# Patient Record
Sex: MALE | Race: WHITE | Employment: FULL TIME | URBAN - METROPOLITAN AREA
[De-identification: names, ages, dates, MRNs, and addresses within clinical notes are randomized per-mention and may not be internally consistent; named-entity substitution may affect disease eponyms.]

---

## 2017-01-31 RX ORDER — MELOXICAM 15 MG/1
TABLET ORAL
Qty: 30 TABLET | Refills: 0 | Status: SHIPPED | OUTPATIENT
Start: 2017-01-31 | End: 2017-03-05 | Stop reason: SDUPTHER

## 2017-03-06 RX ORDER — MELOXICAM 15 MG/1
TABLET ORAL
Qty: 30 TABLET | Refills: 0 | Status: SHIPPED | OUTPATIENT
Start: 2017-03-06 | End: 2017-04-09 | Stop reason: SDUPTHER

## 2017-04-11 RX ORDER — MELOXICAM 15 MG/1
TABLET ORAL
Qty: 30 TABLET | Refills: 0 | Status: SHIPPED | OUTPATIENT
Start: 2017-04-11 | End: 2017-05-10 | Stop reason: SDUPTHER

## 2017-05-03 ENCOUNTER — OFFICE VISIT (OUTPATIENT)
Dept: ORTHOPEDIC SURGERY | Age: 52
End: 2017-05-03

## 2017-05-03 VITALS
HEIGHT: 73 IN | HEART RATE: 54 BPM | SYSTOLIC BLOOD PRESSURE: 116 MMHG | WEIGHT: 205 LBS | BODY MASS INDEX: 27.17 KG/M2 | DIASTOLIC BLOOD PRESSURE: 73 MMHG

## 2017-05-03 DIAGNOSIS — M19.012 GLENOHUMERAL ARTHRITIS, LEFT: Primary | ICD-10-CM

## 2017-05-03 PROCEDURE — 99214 OFFICE O/P EST MOD 30 MIN: CPT | Performed by: ORTHOPAEDIC SURGERY

## 2017-05-10 RX ORDER — MELOXICAM 15 MG/1
TABLET ORAL
Qty: 30 TABLET | Refills: 0 | Status: SHIPPED | OUTPATIENT
Start: 2017-05-10 | End: 2017-06-12 | Stop reason: SDUPTHER

## 2017-06-13 RX ORDER — MELOXICAM 15 MG/1
TABLET ORAL
Qty: 30 TABLET | Refills: 0 | Status: SHIPPED | OUTPATIENT
Start: 2017-06-13 | End: 2017-07-18 | Stop reason: SDUPTHER

## 2017-07-20 RX ORDER — MELOXICAM 15 MG/1
TABLET ORAL
Qty: 30 TABLET | Refills: 0 | Status: SHIPPED | OUTPATIENT
Start: 2017-07-20 | End: 2017-07-20 | Stop reason: SDUPTHER

## 2017-07-20 RX ORDER — MELOXICAM 15 MG/1
15 TABLET ORAL DAILY
Qty: 30 TABLET | Refills: 3 | Status: ON HOLD | OUTPATIENT
Start: 2017-07-20 | End: 2017-12-12 | Stop reason: HOSPADM

## 2017-10-18 ENCOUNTER — OFFICE VISIT (OUTPATIENT)
Dept: ORTHOPEDIC SURGERY | Age: 52
End: 2017-10-18

## 2017-10-18 VITALS
DIASTOLIC BLOOD PRESSURE: 69 MMHG | BODY MASS INDEX: 27.17 KG/M2 | WEIGHT: 205 LBS | HEIGHT: 73 IN | SYSTOLIC BLOOD PRESSURE: 114 MMHG | HEART RATE: 61 BPM

## 2017-10-18 DIAGNOSIS — M25.512 LEFT SHOULDER PAIN, UNSPECIFIED CHRONICITY: Primary | ICD-10-CM

## 2017-10-18 DIAGNOSIS — M19.012 GLENOHUMERAL ARTHRITIS, LEFT: ICD-10-CM

## 2017-10-18 PROCEDURE — 73030 X-RAY EXAM OF SHOULDER: CPT | Performed by: ORTHOPAEDIC SURGERY

## 2017-10-18 PROCEDURE — L3670 SO ACRO/CLAV CAN WEB PRE OTS: HCPCS | Performed by: ORTHOPAEDIC SURGERY

## 2017-10-18 PROCEDURE — 99213 OFFICE O/P EST LOW 20 MIN: CPT | Performed by: ORTHOPAEDIC SURGERY

## 2017-10-23 NOTE — PROGRESS NOTES
History of Present Illness:  Darline Mcqueen is a 51-year-old gentleman who is extremely active and involved in intense workouts including weight lifting presents to the office for follow-up regarding his glenohumeral arthritis of the left shoulder. He was told at his last visit that he would require surgical intervention at some point. He denies any new injuries to the shoulder. He reports he continues to stay active and likes to do at least 200 pushups during his workouts. He complains of increasing pain in the left shoulder with decreased motion as well. Medical History:  Patient's medications, allergies, past medical, surgical, social and family histories were reviewed and updated as appropriate. No notes on file    Review of Systems  A 14 point review of systems was completed by the patient on 10/18/17 and is available in the media section of the scanned medical record and was reviewed on 10/18/17. The review is negative with the exception of those things mentioned in the HPI and Past Medical History    Vital Signs:  Vitals:    10/18/17 0942   BP: 114/69   Pulse: 61       General/Appearance: Alert and oriented and in no apparent distress. Skin:  There are no skin lesions, cellulitis, or extreme edema. The patient has warm and well-perfused Bilateral upper extremities with brisk capillary refill. Left Shoulder Exam:  Inspection:  No gross deformities, no signs of infection. Palpation:  He has mild tenderness of the shoulder. Active Range of Motion: Forward elevation 110°, abduction of 90°, external rotation 35° and internal rotation to the back is to the L4 with active assisted motion, active range of motion is to the sacrum. Passive Range of Motion:  Similar to active    Strength:  4+/5 external rotation with resistance    Special Tests:  No Franklyn muscle deformity.     Neurovascular: Sensation to light touch is intact, no motor deficits, palpable radial pulses 2+    Radiology:     Plain radiographs of the left shoulder comprising 2 views: AP and axillary lateral were obtained and reviewed in the office: Shows severe glenohumeral narrowing along with severe degenerative changes. He has a significant bone spur present. MRI of the left shoulder 9/12/2016      FINDINGS: No recent macrofracture or bony contusion.       Advanced osteoarthrosis of the left glenohumeral joint alignment with remodeling of the humeral    head and glenoid cup, moderate to severe osteophyte formations and severe joint space    narrowing. Mild to moderate joint effusion with suspected free osseous bodies in the axillary    pouch, likely fractured spur. Fraying of the glenoid labrum is noted. Biceps long head tendon    is intact and in normal position.       Tractional pseudocyst formations at the conjoined tendon footprint region suggestive of    internal impingement. A few tractional pseudocyst formations at the subscapularis footprint    region. No substantive tendinopathy or retracted tendon tear. No substantive rotator cuff    muscle atrophy.       Moderate osteoarthritic changes with capsular hypertrophy of the acromioclavicular joint. Type    2 acromion without substantive lateral outlet encroachment.       CONCLUSION:   1. Advanced osteoarthrosis of the glenohumeral joint. Compared to prior study from 2012, this    finding is more conspicuous. 2. Fraying of the glenoid labrum. Moderate joint effusion and suspected free osseous body in    the axillary pouch, likely fractured spur. 3. No substantive rotator cuff muscle atrophy, tendinopathy or tear.          CT scan of the left shoulder, 9/12/2016     FINDINGS: No bony macrofracture. Loss of glenohumeral joint space with eburnation of the    glenoid and humeral head. A few foci of subcortical lucencies in the glenoid. Large spur of    inferior humeral head from anterior to posterior.       Rotator cuff thickness within normal limits. Moderate amount of joint effusion. No loose    ossified bodies.       Muscles unremarkable.       CONCLUSION:   Advanced glenohumeral arthrosis.             Assessment :  Mr. Robles Steven is a 46 y.o. yr old patient with endstage left shoulder glenohumeral arthritis. Impression:  Encounter Diagnoses   Name Primary?  Left shoulder pain, unspecified chronicity Yes    Glenohumeral arthritis, left        Office Procedures:  Orders Placed This Encounter   Procedures    XR Shoulder Left 2 VW     Order Specific Question:   Reason for exam:     Answer:   ap axillary lateral    Breg Sling Shot 3 Shoulder Sling     Patient was prescribed a Breg Sling Shot III Shoulder Brace. The left shoulder will require stabilization / immobilization from this orthosis. The orthosis will assist in protecting the affected area, provide functional support and facilitate healing. The device was ordered and fit on 10/18/2017 . The patient was educated and fit by a healthcare professional with expert knowledge and specialization in brace application while under the direct supervision of the treating physician. Verbal and written instructions for the use of and application of this item were provided. They were instructed to contact the office immediately should the brace result in increased pain, decreased sensation, increased swelling or worsening of the condition. Treatment Plan:  We discussed his diagnosis and the 3 possible surgical interventions I could be performed. Given how much of an active lifestyle this patient leads we are not recommending a total shoulder arthroplasty as he will carry a higher risk for loosening of the prosthesis. We are recommending either a resurfacing versus ream and run procedure with fibrocartilage on the socket versus the polyethylene button. Both procedures give him a high likelihood of 10 years plus survivorship prior to being converted into a total shoulder replacement.

## 2017-11-28 ENCOUNTER — TELEPHONE (OUTPATIENT)
Dept: ORTHOPEDIC SURGERY | Age: 52
End: 2017-11-28

## 2017-11-30 ENCOUNTER — HOSPITAL ENCOUNTER (OUTPATIENT)
Dept: PREADMISSION TESTING | Age: 52
Discharge: HOME | End: 2017-11-30
Attending: ORTHOPAEDIC SURGERY | Admitting: ORTHOPAEDIC SURGERY

## 2017-11-30 VITALS
SYSTOLIC BLOOD PRESSURE: 104 MMHG | OXYGEN SATURATION: 99 % | TEMPERATURE: 98.2 F | HEART RATE: 53 BPM | RESPIRATION RATE: 12 BRPM | HEIGHT: 73 IN | BODY MASS INDEX: 27.17 KG/M2 | WEIGHT: 205 LBS | DIASTOLIC BLOOD PRESSURE: 63 MMHG

## 2017-11-30 LAB
ABO/RH: NORMAL
ANION GAP SERPL CALCULATED.3IONS-SCNC: 10 MMOL/L (ref 3–16)
ANTIBODY SCREEN: NORMAL
APTT: 31 SEC (ref 24.1–34.9)
BASOPHILS ABSOLUTE: 0 K/UL (ref 0–0.2)
BASOPHILS RELATIVE PERCENT: 0.6 %
BILIRUBIN URINE: NEGATIVE
BLOOD, URINE: NEGATIVE
BUN BLDV-MCNC: 19 MG/DL (ref 7–20)
CALCIUM SERPL-MCNC: 8.7 MG/DL (ref 8.3–10.6)
CHLORIDE BLD-SCNC: 103 MMOL/L (ref 99–110)
CLARITY: CLEAR
CO2: 30 MMOL/L (ref 21–32)
COLOR: YELLOW
CREAT SERPL-MCNC: 0.9 MG/DL (ref 0.9–1.3)
EOSINOPHILS ABSOLUTE: 0.1 K/UL (ref 0–0.6)
EOSINOPHILS RELATIVE PERCENT: 2 %
GFR AFRICAN AMERICAN: >60
GFR NON-AFRICAN AMERICAN: >60
GLUCOSE BLD-MCNC: 91 MG/DL (ref 70–99)
GLUCOSE URINE: NEGATIVE MG/DL
HCT VFR BLD CALC: 38.1 % (ref 40.5–52.5)
HEMOGLOBIN: 12.5 G/DL (ref 13.5–17.5)
INR BLD: 0.96 (ref 0.85–1.15)
KETONES, URINE: NEGATIVE MG/DL
LEUKOCYTE ESTERASE, URINE: NEGATIVE
LYMPHOCYTES ABSOLUTE: 0.8 K/UL (ref 1–5.1)
LYMPHOCYTES RELATIVE PERCENT: 17 %
MCH RBC QN AUTO: 24 PG (ref 26–34)
MCHC RBC AUTO-ENTMCNC: 32.7 G/DL (ref 31–36)
MCV RBC AUTO: 73.3 FL (ref 80–100)
MICROSCOPIC EXAMINATION: NORMAL
MONOCYTES ABSOLUTE: 0.5 K/UL (ref 0–1.3)
MONOCYTES RELATIVE PERCENT: 10.4 %
NEUTROPHILS ABSOLUTE: 3.2 K/UL (ref 1.7–7.7)
NEUTROPHILS RELATIVE PERCENT: 70 %
NITRITE, URINE: NEGATIVE
PDW BLD-RTO: 15.6 % (ref 12.4–15.4)
PH UA: 6
PLATELET # BLD: 163 K/UL (ref 135–450)
PMV BLD AUTO: 9.4 FL (ref 5–10.5)
POTASSIUM SERPL-SCNC: 4.4 MMOL/L (ref 3.5–5.1)
PROTEIN UA: NEGATIVE MG/DL
PROTHROMBIN TIME: 10.9 SEC (ref 9.6–13)
RBC # BLD: 5.2 M/UL (ref 4.2–5.9)
SODIUM BLD-SCNC: 143 MMOL/L (ref 136–145)
SPECIFIC GRAVITY UA: 1.01
URINE TYPE: NORMAL
UROBILINOGEN, URINE: 0.2 E.U./DL
WBC # BLD: 4.6 K/UL (ref 4–11)

## 2017-11-30 RX ORDER — CHLORHEXIDINE GLUCONATE 0.12 MG/ML
15 RINSE ORAL 2 TIMES DAILY
Status: CANCELLED | OUTPATIENT
Start: 2017-12-10 | End: 2017-12-01

## 2017-11-30 RX ORDER — GLUTAMINE 100 %
POWDER (GRAM) ORAL DAILY
COMMUNITY

## 2017-11-30 RX ORDER — MULTIVIT,IRON,MINERALS/LUTEIN
TABLET ORAL DAILY
COMMUNITY

## 2017-11-30 RX ORDER — CHLORAL HYDRATE 500 MG
3000 CAPSULE ORAL DAILY
COMMUNITY

## 2017-11-30 RX ORDER — M-VIT,TX,IRON,MINS/CALC/FOLIC 27MG-0.4MG
1 TABLET ORAL DAILY
COMMUNITY

## 2017-11-30 RX ORDER — CHLORHEXIDINE GLUCONATE 0.12 MG/ML
15 RINSE ORAL 2 TIMES DAILY
Status: CANCELLED | OUTPATIENT
Start: 2017-12-10

## 2017-11-30 NOTE — PRE-PROCEDURE INSTRUCTIONS
[address]                          Date of Procedure  12/11  Time of Procedure 0730    PRIOR TO PROCEDURE DATE:  1. Please follow any guidelines/instructions prior to your procedure as advised by your surgeon. 2. Arrange for someone to drive you home and be with you for the first 24 hours after discharge for your safety after your procedure for which you received sedation. Ensure it is someone we can share information with regarding your discharge. 3. You must contact your surgeon for instructions IF:   You are taking any blood thinners, aspirin, anti-inflammatory or vitamin E.   There is a change in your physical condition such as a cold, fever, rash, cuts, sores or any other infection, especially near your surgical site. 4. Do not drink alcohol the day before or day of your procedure. 5. A Pre-op History and Physical for surgery MUST be completed by your Physician or Urgent Care within 30 days of your procedure date. Please bring a copy with you on the day of your procedure and along with any other testing performed. THE DAY OF YOUR PROCEDURE:  1. Follow instructions for ARRIVAL TIME as DIRECTED BY YOUR SURGEON. If your surgeon does not give you a specific arrival time, please arrive at  [facility]- verify with surgeon . 2. Enter the MAIN entrance from [address] and follow the signs to the free [facility] or [facility] parking (offered free of charge 6am-5pm). 3. Enter the Main Entrance of the hospital (do NOT enter from the lower level of the parking garage). Upon entrance, check in with the  at the main desk on your left. If no one is available at the desk, proceed into the [facility] Waiting Room and go through the door directly into the [facility]. There is a Check-in desk ACROSS from Room 5 (marked with a sign hanging from the ceiling).  The phone number for the surgery center is [phone].    4. DO NOT EAT OR DRINK

## 2017-12-01 LAB
ESTIMATED AVERAGE GLUCOSE: 91.1 MG/DL
HBA1C MFR BLD: 4.8 %
MRSA SCREEN RT-PCR: NORMAL
URINE CULTURE, ROUTINE: NORMAL

## 2017-12-01 RX ORDER — SODIUM CHLORIDE, SODIUM LACTATE, POTASSIUM CHLORIDE, CALCIUM CHLORIDE 600; 310; 30; 20 MG/100ML; MG/100ML; MG/100ML; MG/100ML
INJECTION, SOLUTION INTRAVENOUS CONTINUOUS
Status: CANCELLED | OUTPATIENT
Start: 2017-12-10

## 2017-12-11 PROBLEM — M19.012 OSTEOARTHRITIS OF LEFT SHOULDER: Status: ACTIVE | Noted: 2017-12-11

## 2017-12-18 ENCOUNTER — OFFICE VISIT (OUTPATIENT)
Dept: ORTHOPEDIC SURGERY | Age: 52
End: 2017-12-18

## 2017-12-18 VITALS
BODY MASS INDEX: 27.17 KG/M2 | HEART RATE: 59 BPM | SYSTOLIC BLOOD PRESSURE: 114 MMHG | WEIGHT: 205 LBS | DIASTOLIC BLOOD PRESSURE: 68 MMHG | HEIGHT: 73 IN

## 2017-12-18 DIAGNOSIS — Z96.612 STATUS POST LEFT SHOULDER HEMIARTHROPLASTY: ICD-10-CM

## 2017-12-18 DIAGNOSIS — M25.512 ACUTE PAIN OF LEFT SHOULDER: Primary | ICD-10-CM

## 2017-12-18 PROCEDURE — 73030 X-RAY EXAM OF SHOULDER: CPT | Performed by: PHYSICIAN ASSISTANT

## 2017-12-18 PROCEDURE — 99024 POSTOP FOLLOW-UP VISIT: CPT | Performed by: PHYSICIAN ASSISTANT

## 2017-12-18 NOTE — PROGRESS NOTES
History of Present Illness:  Ally Chávez is a 46 y.o. male who presents for a post operative visit. The patient underwent a left shoulder hemiarthroplasty ream and run procedure on 12/11/17 by Dr. Khushi Breaux. Overall he is doing okay and feels that their pain is well controlled with current pain medications. He reports he took a few Percocets the first couple days after surgery however he is primarily taking Tylenol for pain control currently. He has been compliant with wearing the UltraSling brace at all times. He has been using his CPM machine at home and has been icing his shoulder significantly. The patient deny fevers, chills, numbness, tingling, and shortness of breath. Medical History:  Patient's medications, allergies, past medical, surgical, social and family histories were reviewed and updated as appropriate. No notes on file    Review of Systems  A 14 point review of systems was completed by the patient on 10/18/17 and is available in the media section of the scanned medical record and was reviewed on 12/18/2017. Vital Signs:  Vitals:    12/18/17 0907   BP: 114/68   Pulse: 59       General/Appearance: Alert and oriented and in no apparent distress. Skin:  There are no skin lesions, cellulitis, or extreme edema. The patient has warm and well-perfused Bilateral upper extremities with brisk capillary refill. left Shoulder Exam:    Inspection: left shoulder incision that is clean, dry and intact and well approximated. The Prineo dressing is still in place. Mild ecchymosis and swelling are present as can be expected. There is no erythema, drainage or other signs of infection    Palpation:  No crepitus to gentle motion    Active Range of Motion: Deferred    Passive Range of Motion:  He is able to tolerate forward elevation to 40° and external rotation 25°    Strength:  Deferred    Special Tests:  Deferred.     Neurovascular: Sensation to light touch is intact, no motor deficits, palpable radial pulses 2+    Radiology:     Plain radiographs of the left shoulder comprising 2 views: AP and axillary lateral were obtained and reviewed in the office: Shows postsurgical changes from the left hemiarthroplasty. All the components are in good placement without any signs of loosening, fractures, subluxations or dislocations. Impression: Stable postop x-ray. Assessment :  Mr. Zach Sanchez is a 46 y.o. yr old patient who is 1 week status post a left shoulder hemiarthroplasty, ream and run procedure. Impression:  Encounter Diagnoses   Name Primary?  Acute pain of left shoulder Yes    Status post left shoulder hemiarthroplasty        Office Procedures:  Orders Placed This Encounter   Procedures    Shoulder Left 2V     [id number]     Order Specific Question:   Reason for exam:     Answer:   Pain       Treatment Plan:    Overall the patient is doing well. The pain is well-controlled. He was told that he may take Tylenol for pain control. He still has his prescription medication if he does need that. We recommend that he wear the UltraSling brace at all times with the exception of clothing, bathing of physical therapy. The patient was told that he is restricted from driving for at least 3 weeks postop. We will give a print out of their physical therapy order. He has opted to do his therapy at the [facility]. He may continue use of CPM machine. All of his questions were fully answered today. We would like to see him back in 2 weeks for follow-up visit. Janis Farris PA-C  Orthopaedic Sports Medicine Physician Assistant    This dictation was performed with a verbal recognition program [facility]) and it was checked for errors. It is possible that there are still dictated errors within this office note. If so, please bring any errors to my attention for an addendum. All efforts were made to ensure that this office note is accurate.

## 2018-01-03 ENCOUNTER — OFFICE VISIT (OUTPATIENT)
Dept: ORTHOPEDIC SURGERY | Age: 53
End: 2018-01-03

## 2018-01-03 VITALS
HEART RATE: 65 BPM | DIASTOLIC BLOOD PRESSURE: 69 MMHG | BODY MASS INDEX: 27.17 KG/M2 | SYSTOLIC BLOOD PRESSURE: 118 MMHG | HEIGHT: 73 IN | WEIGHT: 205 LBS

## 2018-01-03 DIAGNOSIS — Z96.612 STATUS POST TOTAL SHOULDER ARTHROPLASTY, LEFT: Primary | ICD-10-CM

## 2018-01-03 PROCEDURE — 99024 POSTOP FOLLOW-UP VISIT: CPT | Performed by: ORTHOPAEDIC SURGERY

## 2018-01-03 NOTE — LETTER
Home exercise program (copy to patient). Perform exercises for:   15    minutes    3      times/day   Supervised physical therapy  Frequency:   1x week   2x week   3x week   Other:   Duration:  2 weeks    4 weeks   6 weeks   Other:     Additional Instructions: Will advance CPM incrementally. Obviously, off to good start but needs to stay within program and protect subscapularis (no active IR especially in max IR position)      Adrián Dixon MD, PhD  1/3/2018

## 2018-02-07 ENCOUNTER — OFFICE VISIT (OUTPATIENT)
Dept: ORTHOPEDIC SURGERY | Age: 53
End: 2018-02-07

## 2018-02-07 VITALS
BODY MASS INDEX: 27.17 KG/M2 | SYSTOLIC BLOOD PRESSURE: 107 MMHG | HEART RATE: 72 BPM | WEIGHT: 205.03 LBS | HEIGHT: 73 IN | DIASTOLIC BLOOD PRESSURE: 72 MMHG

## 2018-02-07 DIAGNOSIS — Z96.612 STATUS POST LEFT SHOULDER HEMIARTHROPLASTY: Primary | ICD-10-CM

## 2018-02-07 PROCEDURE — 99024 POSTOP FOLLOW-UP VISIT: CPT | Performed by: ORTHOPAEDIC SURGERY

## 2018-02-07 NOTE — LETTER
Physical Therapy Rehabilitation Referral    Patient Name:  Alexandra De Souza      YOB: 1965    Diagnosis:    1. Status post left shoulder hemiarthroplasty        Precautions:      Evaluate and Treat    Post Op Instructions:   Continuous passive motion (CPM)  Elbow ROM   Exercise in plane of scapula    Strengthening      Pulley and instruction    Home exercise program (copy to patient)    Sling when arm at risk   Sling or brace at all times    AROM: Forward elevation              AROM: External rotation     Isometric external rotator strengthening  AAROM: internal rotation: up the back   Isometric abductor strengthening   AAROM: Internal abduction    Isometric internal rotator strengthening  AAROM: cross-body adduction             Stretching:     Strengthening:   Four quadrant (FE, ER, IR, CBA)   Rotator cuff (ER, Abd)   Forward Elevation     External Rotators      External Rotation     Internal Rotators   Internal Rotation: up/back    Abductors      Internal Rotation: supine in abduction  Flexors   Cross-body abduction     Extensors   Pendulum (FE, Abd/Add, cw/ccw)   Scapular Stabilizers    Wall-walking (FE, Abd)         Shoulder shrugs      Table slides (FE)                 Rhomboid pinch   Elbow (flex, ext, pron, sup)         Lat.  Pull downs      Medial epicondylitis program        Forward punch    Lateral epicondylitis program        Internal rotators      Progressive resistive exercises   Bench Press         Bench press plus  Activities:      Lateral pull-downs   Rowing      Progressive two-hand supine press   Stepper/Exercise bike    Biceps: curls/supination   Swimming   Water exercises    Modalities:     Return to Sport:   Of Choice       Plyometrics   Ultrasound      Rhythmic stabilization   Iontophoresis     Core strengthening    Moist heat      Sports specific program:    Massage          Cryotherapy       Electrical stimulation      Paraffin   Whirlpool   TENS Home exercise program (copy to patient). Perform exercises for:   30     minutes    3      times/day   Supervised physical therapy  Frequency:   1x week   2x week   3x week   Other:   Duration:  2 weeks    4 weeks   6 weeks   Other:     Additional Instructions:   Continue to work on AROM in all planes. May start gentle strengthening for deltoid and external rotators. NO IR strengthening yet. May start gentle cardio on Elliptical/ Stairmaster. No treadmill or jump rope until 10 weeks post op.             Nghia Montano MD

## 2018-03-14 ENCOUNTER — OFFICE VISIT (OUTPATIENT)
Dept: ORTHOPEDIC SURGERY | Age: 53
End: 2018-03-14

## 2018-03-14 VITALS
HEIGHT: 72 IN | DIASTOLIC BLOOD PRESSURE: 68 MMHG | HEART RATE: 57 BPM | WEIGHT: 205 LBS | SYSTOLIC BLOOD PRESSURE: 114 MMHG | BODY MASS INDEX: 27.77 KG/M2

## 2018-03-14 DIAGNOSIS — Z96.612 STATUS POST LEFT SHOULDER HEMIARTHROPLASTY: Primary | ICD-10-CM

## 2018-03-14 PROCEDURE — 99213 OFFICE O/P EST LOW 20 MIN: CPT | Performed by: ORTHOPAEDIC SURGERY

## 2018-03-14 NOTE — LETTER
Shoulder Elbow Rehabilitation Referral    Patient Name: Tarah Yoo      YOB: 1965    Diagnosis: left shoulder resurfacing hemiarthroplasty    Surgery Date: 12/11/17      Post Op Instructions:  [] Continuous passive motion (CPM)  [] Elbow range of motion  [] Exercise in plane of scapula  []  Strengthening     [] Pulley and instruction   [x] Home exercise program (copy to patient)   [] Sling when arm at risk  [] Sling or brace at all times   [] AAROM: Forward elevation to              [] AAROM: External rotation  To      [] Isometric external rotator strengthening [] AAROM: internal rotation: up the back  [] Isometric abductor strengthening  [] AAROM: Internal abduction     [] Isometric internal rotator strengthening [] AAROM: cross-body adduction             Stretching:     Strengthening:  [] Four quadrant (FE, ER, IR, CBA)  [] Sleeper stretch    [] Rotator cuff (ER, IR, Abd)  [x] Forward Elevation    [x] External Rotators     [x] External Rotation    [x] Internal Rotators  [x] Internal Rotation: up/back   [x] Abductors     [x] Internal Rotation: supine in abduction [x] Flexors  [x] Cross-body abduction    [x] Extensors  [] Pendulum (FE, Abd/Add, cw/ccw)  [x] Scapular Stabilizers   [] Wall-walking (FE, Abd)    [x] Shoulder shrugs     [] Table slides      [x] Rhomboid pinch  [] Elbow (flex, ext, pron, sup)    [x] Lat.  Pull downs     [] Medial epicondylitis program   [] Forward punch   [] Lateral epicondylitis program   [x] Internal rotators     [] Progressive resistive exercises  [] Bench Press        [] Bench press plus  Activities:     [] Lateral pull-downs  [] Rowing     [] Progressive two-hand supine press  [] Stepper/Exercise bike   [] Biceps: curls/supination  [] Swimming  [] Water exercises    Modalities: PRN    Return to Sport:  [] Ultrasound     [] Plyometrics  [] Iontophoresis    [] Rhythmic stabilization  [x] Moist heat     [] Core strengthening [x] Massage     [] Sports specific program:      [x] Cryotherapy      [] Electrical stimulation     [] Paraffin  [] Whirlpool  [] TENS    [x] Home exercise program (copy to patient). [x] Supervised physical therapy  Frequency: [x]  1x week  [] 2x week  [] 3x week  [] Other:   Duration: [] 2 weeks   [] 4 weeks  [x] 6 weeks  [] Other:     Sincerely,        Kim Reynolds MD  Clinical Fellow in [address] [place] [address]  3/14/2018     This dictation was performed with a verbal recognition program [facility]) and it was checked for errors. It is possible that there are still dictated errors within this office note. If so, please bring any errors to my attention for an addendum. All efforts were made to ensure that this office note is accurate.

## 2018-03-14 NOTE — PROGRESS NOTES
gross instability    Neurovascular: Neurovascularly intact    Special Tests: Negative belly press test negative [place] sign        Diagnostics:  Radiology:     1 x-rays obtained today      Assessment: Patient is nearly 3 months status post left shoulder hemiarthroplasty resurfacing. Overall, Colon Spurling is doing well      Plan: We have counseled the patient extensively about his postoperative rehab. We do think that there is room for improvement in his range of motion even though the patient has done extremly well so far. As such we will be sending over new instructions to his physical therapist to focus on stretching and range of motion exercises of his left shoulder. We do believe that the patient's strength is gradually improving and we will work on this as well but at this point want to focus on increasing his range of motion. The patient stated a good understanding of everything that was explained and will follow up in our clinic in 1 month for reevaluation. Orders Placed This Encounter   Procedures    Amb External Referral To Physical Therapy     Referral Priority:   Routine     Referral Type:   Consult for Advice and Opinion     Referral Reason:   Patient Preference     Requested Specialty:   Physical Therapy     Number of Visits Requested:   [address] MD  [address] [place] [address]  Date:    3/14/2018    This dictation was performed with a verbal recognition program ([employer]) and it was checked for errors. It is possible that there are still dictated errors within this office note. If so, please bring any errors to my attention for an addendum. All efforts were made to ensure that this office note is accurate.      Attestation  I attest that my encounter today with rekha Clay  was supervised by Dr. Gladys Evans  ________________  I was physically present and personally supervised the Orthopaedic Sports Medicine Fellow in the evaluation and development of a

## 2018-05-09 ENCOUNTER — OFFICE VISIT (OUTPATIENT)
Dept: ORTHOPEDIC SURGERY | Age: 53
End: 2018-05-09

## 2018-05-09 VITALS
HEART RATE: 59 BPM | SYSTOLIC BLOOD PRESSURE: 117 MMHG | HEIGHT: 72 IN | DIASTOLIC BLOOD PRESSURE: 69 MMHG | BODY MASS INDEX: 27.77 KG/M2 | WEIGHT: 205.03 LBS

## 2018-05-09 DIAGNOSIS — Z96.612 S/P SHOULDER HEMIARTHROPLASTY, LEFT: Primary | ICD-10-CM

## 2018-05-09 PROCEDURE — 99213 OFFICE O/P EST LOW 20 MIN: CPT | Performed by: ORTHOPAEDIC SURGERY

## 2018-12-12 ENCOUNTER — OFFICE VISIT (OUTPATIENT)
Dept: ORTHOPEDIC SURGERY | Age: 53
End: 2018-12-12
Payer: COMMERCIAL

## 2018-12-12 VITALS
BODY MASS INDEX: 27.17 KG/M2 | HEIGHT: 73 IN | WEIGHT: 205 LBS | DIASTOLIC BLOOD PRESSURE: 67 MMHG | SYSTOLIC BLOOD PRESSURE: 108 MMHG | HEART RATE: 61 BPM

## 2018-12-12 DIAGNOSIS — Z96.612 S/P SHOULDER HEMIARTHROPLASTY, LEFT: ICD-10-CM

## 2018-12-12 PROCEDURE — 99213 OFFICE O/P EST LOW 20 MIN: CPT | Performed by: ORTHOPAEDIC SURGERY

## 2019-12-18 ENCOUNTER — OFFICE VISIT (OUTPATIENT)
Dept: ORTHOPEDIC SURGERY | Age: 54
End: 2019-12-18
Payer: COMMERCIAL

## 2019-12-18 VITALS
HEIGHT: 73 IN | BODY MASS INDEX: 27.17 KG/M2 | WEIGHT: 205.03 LBS | DIASTOLIC BLOOD PRESSURE: 63 MMHG | SYSTOLIC BLOOD PRESSURE: 116 MMHG | HEART RATE: 55 BPM

## 2019-12-18 DIAGNOSIS — Z96.612 S/P SHOULDER HEMIARTHROPLASTY, LEFT: Primary | ICD-10-CM

## 2019-12-18 PROCEDURE — 4004F PT TOBACCO SCREEN RCVD TLK: CPT | Performed by: ORTHOPAEDIC SURGERY

## 2019-12-18 PROCEDURE — G8427 DOCREV CUR MEDS BY ELIG CLIN: HCPCS | Performed by: ORTHOPAEDIC SURGERY

## 2019-12-18 PROCEDURE — 99214 OFFICE O/P EST MOD 30 MIN: CPT | Performed by: ORTHOPAEDIC SURGERY

## 2019-12-18 PROCEDURE — G8484 FLU IMMUNIZE NO ADMIN: HCPCS | Performed by: ORTHOPAEDIC SURGERY

## 2019-12-18 PROCEDURE — 3017F COLORECTAL CA SCREEN DOC REV: CPT | Performed by: ORTHOPAEDIC SURGERY

## 2019-12-18 PROCEDURE — G8419 CALC BMI OUT NRM PARAM NOF/U: HCPCS | Performed by: ORTHOPAEDIC SURGERY

## 2019-12-18 ASSESSMENT — ENCOUNTER SYMPTOMS
EYES NEGATIVE: 1
GASTROINTESTINAL NEGATIVE: 1
ALLERGIC/IMMUNOLOGIC NEGATIVE: 1
RESPIRATORY NEGATIVE: 1

## 2019-12-20 ENCOUNTER — OFFICE VISIT (OUTPATIENT)
Dept: ORTHOPEDIC SURGERY | Age: 54
End: 2019-12-20
Payer: COMMERCIAL

## 2019-12-20 VITALS
HEART RATE: 63 BPM | DIASTOLIC BLOOD PRESSURE: 65 MMHG | WEIGHT: 200 LBS | BODY MASS INDEX: 26.51 KG/M2 | SYSTOLIC BLOOD PRESSURE: 107 MMHG | HEIGHT: 73 IN

## 2019-12-20 DIAGNOSIS — M19.012 ARTHRITIS OF LEFT ACROMIOCLAVICULAR JOINT: ICD-10-CM

## 2019-12-20 DIAGNOSIS — Z96.612 S/P SHOULDER HEMIARTHROPLASTY, LEFT: Primary | ICD-10-CM

## 2019-12-20 PROCEDURE — 3017F COLORECTAL CA SCREEN DOC REV: CPT | Performed by: ORTHOPAEDIC SURGERY

## 2019-12-20 PROCEDURE — G8419 CALC BMI OUT NRM PARAM NOF/U: HCPCS | Performed by: ORTHOPAEDIC SURGERY

## 2019-12-20 PROCEDURE — G8427 DOCREV CUR MEDS BY ELIG CLIN: HCPCS | Performed by: ORTHOPAEDIC SURGERY

## 2019-12-20 PROCEDURE — G8484 FLU IMMUNIZE NO ADMIN: HCPCS | Performed by: ORTHOPAEDIC SURGERY

## 2019-12-20 PROCEDURE — 4004F PT TOBACCO SCREEN RCVD TLK: CPT | Performed by: ORTHOPAEDIC SURGERY

## 2019-12-20 PROCEDURE — 99213 OFFICE O/P EST LOW 20 MIN: CPT | Performed by: ORTHOPAEDIC SURGERY

## 2020-01-15 ENCOUNTER — OFFICE VISIT (OUTPATIENT)
Dept: ORTHOPEDIC SURGERY | Age: 55
End: 2020-01-15
Payer: COMMERCIAL

## 2020-01-15 VITALS
HEIGHT: 73 IN | BODY MASS INDEX: 26.5 KG/M2 | HEART RATE: 52 BPM | SYSTOLIC BLOOD PRESSURE: 126 MMHG | WEIGHT: 199.96 LBS | DIASTOLIC BLOOD PRESSURE: 72 MMHG

## 2020-01-15 PROCEDURE — 3017F COLORECTAL CA SCREEN DOC REV: CPT | Performed by: ORTHOPAEDIC SURGERY

## 2020-01-15 PROCEDURE — G8419 CALC BMI OUT NRM PARAM NOF/U: HCPCS | Performed by: ORTHOPAEDIC SURGERY

## 2020-01-15 PROCEDURE — G8427 DOCREV CUR MEDS BY ELIG CLIN: HCPCS | Performed by: ORTHOPAEDIC SURGERY

## 2020-01-15 PROCEDURE — G8484 FLU IMMUNIZE NO ADMIN: HCPCS | Performed by: ORTHOPAEDIC SURGERY

## 2020-01-15 PROCEDURE — 4004F PT TOBACCO SCREEN RCVD TLK: CPT | Performed by: ORTHOPAEDIC SURGERY

## 2020-01-15 PROCEDURE — 99214 OFFICE O/P EST MOD 30 MIN: CPT | Performed by: ORTHOPAEDIC SURGERY

## 2020-01-15 NOTE — PROGRESS NOTES
No  Are there other pain locations you wish to document?: No    Review of Systems    Last updated on 12/18/19       Review of Systems:  A 14 point review of systems available in the scanned medical record as documented by the patient. The review is negative with the exception of those things mentioned in the History of Present Illness and Past Medical History. Past Medical History:  Patient's medications, allergies, past medical, surgical, social and family histories were reviewed and updated as appropriate. Allergies: Allergies   Allergen Reactions    Penicillins Hives       Physical Exam:  Vitals:    01/15/20 1115   BP: 126/72   Pulse: 52     General: Chas De La Rosa is a healthy and well appearing 47 y.o. male who is sitting comfortably in our office in acute distress. Skin:  There are no skin lesions, cellulitis, or extreme edema. The patient has warm and well-perfused Bilateral upper extremities with brisk capillary refill. Eyes: Extra-ocular muscles intact  Mouth: Oral mucosa moist. No perioral lesions  Pulm: Respirations unlabored and regular. Neuro: Alert and oriented x3    right Shoulder Exam:  Inspection:  No gross deformities, no signs of infection. Palpation: No crepitus     Active Range of Motion: Forward elevation of 135, abduction of 135, external rotation with elbow at the side 40, internal rotation to the back is L3    Passive Range of Motion: Forward elevation to 140 degrees. Strength: External rotation with resistance with elbow at the side +4/5, internal rotation with resistance with elbow at the side 5/5, supraspinatus testing 4/5    Special Tests:   No Franklyn muscle deformity. Neurovascular: Sensation to light touch is intact, no motor deficits, palpable radial pulses 2+    Additional Examinations:    Examination of the contralateral extremity does not show any tenderness, deformity or injury. Range of motion is unremarkable. There is no gross instability.  There and the resurfacing is 1mm, and normal congruency is    demonstrated.       Hypertrophy of the posterior rim of the glenoid may preexist the surgery.  See axial image    number 87 of series 3.       The rotator cuff muscles are normal in volume, including the supraspinatus.       CONCLUSION:   1. No evidence for loosening of the humeral hemiarthroplasty. 2. The subacromial interval measures 5mm   3. Congruent and narrowed arthroplasty-glenoid gap that measures 1mm. 4. Hypertrophy of the posterior rim of the glenoid may preexist the surgery. 5. Normal volume of the rotator group muscles. 6. Remote unhealed fracture at the posterior aspect of the distal clavicle. 7. Loose appearance of one and suspected loose second nonmetallic anchor. Assessment:  Eriberto Argueta is a 47 y.o. male with a history of undergoing a left shoulder resurfacing hemiarthroplasty on 12/1/2017. He has some cuff thinning but no tear. The most likely explanation for his pain is some glenoid erosion and overuse. Impression:  Encounter Diagnosis   Name Primary?  S/P shoulder hemiarthroplasty, left Yes       Office Procedures:  No orders of the defined types were placed in this encounter. Plan: We had a lengthy discussion with the patient today. We were able to review the ultrasound and the CT scan along with his most recent lab results. There is very little evidence for any ongoing infection of the left shoulder. There is some evidence that he may be wearing into the glenoid. At this point we recommend that he continue to do activities as tolerated. If he comes to a point where the discomfort is beyond his threshold we can certainly consider surgical intervention which would include converting to an anatomic total shoulder replacement. All this was discussed in full detail with the patient. They were agreeable to the above plan.   We would like to see him yearly for radiographs to watch his left

## 2020-12-16 ENCOUNTER — OFFICE VISIT (OUTPATIENT)
Dept: ORTHOPEDIC SURGERY | Age: 55
End: 2020-12-16
Payer: COMMERCIAL

## 2020-12-16 VITALS — BODY MASS INDEX: 26.5 KG/M2 | HEIGHT: 73 IN | WEIGHT: 199.96 LBS | TEMPERATURE: 97.2 F

## 2020-12-16 PROCEDURE — G8484 FLU IMMUNIZE NO ADMIN: HCPCS | Performed by: ORTHOPAEDIC SURGERY

## 2020-12-16 PROCEDURE — 3017F COLORECTAL CA SCREEN DOC REV: CPT | Performed by: ORTHOPAEDIC SURGERY

## 2020-12-16 PROCEDURE — 99213 OFFICE O/P EST LOW 20 MIN: CPT | Performed by: ORTHOPAEDIC SURGERY

## 2020-12-16 PROCEDURE — G8427 DOCREV CUR MEDS BY ELIG CLIN: HCPCS | Performed by: ORTHOPAEDIC SURGERY

## 2020-12-16 PROCEDURE — G8419 CALC BMI OUT NRM PARAM NOF/U: HCPCS | Performed by: ORTHOPAEDIC SURGERY

## 2020-12-16 PROCEDURE — 4004F PT TOBACCO SCREEN RCVD TLK: CPT | Performed by: ORTHOPAEDIC SURGERY

## 2020-12-16 NOTE — PROGRESS NOTES
12 [facility]  History and Physical  Shoulder Pain    Date:  2020    Name:  Levon Mccollum  Address:  [address] [address]    :  1965      Age:   54 y.o.    SSN:  [id number]      Medical Record Number:  [id number]    Reason for Visit:    Follow-up (left shoulder )      HPI:   Levon Mccollum is a 54 y.o. male who presents to our office today for follow up of the left shoulder pain. Patient underwent a left shoulder resurfacing hemiarthroplasty by Dr. Kaitlyn Lozano on 2017. Overall he does feel that the goal of the surgery which is to allow him to continue to have a high level of activity including being able to work out has been met. He reports he does work out daily however over the last year he has learned what to do and what not to do. He reports he works out early in the morning and does have some discomfort soon after. He takes 2 ibuprofen which helps him for the rest of the day. He reports he sleeps comfortably at nighttime. Pain Assessment  Location of Pain: Shoulder  Location Modifiers: Left  Severity of Pain: 3  Quality of Pain: Aching  Duration of Pain: Persistent  Frequency of Pain: Constant  Aggravating Factors: Other (Comment)(certain use)  Relieving Factors: Rest  Result of Injury: No  Work-Related Injury: No  Are there other pain locations you wish to document?: No    Review of Systems    Done: 19      Review of Systems:  A 14 point review of systems available in the scanned medical record as documented by the patient. The review is negative with the exception of those things mentioned in the History of Present Illness and Past Medical History. Past Medical History:  Patient's medications, allergies, past medical, surgical, social and family histories were reviewed and updated as appropriate. Allergies:   Allergies   Allergen Reactions    Penicillins Hives       Physical Exam:  Vitals: 12/16/20 0908   Temp: 97.2 °F (36.2 °C)     General: Raul Nayak is a healthy and well appearing 54 y.o. male who is sitting comfortably in our office in acute distress. Skin:  There are no skin lesions, cellulitis, or extreme edema. The patient has warm and well-perfused Bilateral upper extremities with brisk capillary refill. Eyes: Extra-ocular muscles intact  Mouth: Oral mucosa moist. No perioral lesions  Pulm: Respirations unlabored and regular. Neuro: Alert and oriented x3    left Shoulder Exam:  Inspection:  No gross deformities, no signs of infection. Palpation: No crepitus and no tenderness about the shoulder    Active Range of Motion: Forward elevation of 120, abduction of 110, external rotation with elbow at the side 40, internal rotation to the back is T12    Passive Range of Motion: deferred    Strength: External rotation with resistance with elbow at the side 4/5, internal rotation with resistance with elbow at the side 5/5, supraspinatus testing 4/5    Special Tests: Negative external lag, no Franklyn muscle deformity. Neurovascular: Sensation to light touch is intact, no motor deficits, palpable radial pulses 2+    Additional Examinations:    Examination of the contralateral extremity does not show any tenderness, deformity or injury. Range of motion is unremarkable. There is no gross instability. There are no rashes, ulcerations or lesions. Strength and tone are normal.      Radiographic:  3 xray views of the left  shoulder including True AP in internal and external and axillary lateral were taken in our office today reveals a hemiarthroplasty of the left shoulder. There is narrowing of the acromium to tuberosity height with mild wearing in to the glenoid. No fractures, dislocations, visible tumors, or signs of acute trauma. Self assessment questionnaires including ASES and Simple Shoulder Test were completed today.     Assessment: Nishant Villalpando is a 54 y.o. male who underwent a left shoulder resurfacing hemiarthroplasty on December 1, 2017. Radiographically there are signs of rotator cuff insufficiency. Clinically he continues to do very well. Impression:  Encounter Diagnoses   Name Primary?  Left shoulder pain, unspecified chronicity Yes    Status post left shoulder hemiarthroplasty        Office Procedures:  Orders Placed This Encounter   Procedures    XR SHOULDER LEFT (MIN 2 VIEWS)     Standing Status:   Future     Number of Occurrences:   1     Standing Expiration Date:   12/16/2021       Plan:   He continues to show stability with the partial replacement. At this point we recommend he continues with activities as tolerated. He may have to modify some activities that are over shoulder height level. We will see him back on a yearly basis to review radiographs. We had him fill out a self-assessment questionnaire. Greater than 15 minutes were expended during today's visit with 50% devoted to discussion of assessment and long term plan/ treatment options over time. 12/16/2020  9:30 AM      Jennifer Mercer PA-C  Orthopaedic Sports Medicine Physician Assistant    During this examination, IJennifer PA-C, functioned as a scribe for Dr. Brigido Angel. This dictation was performed with a verbal recognition program (DRAGON) and it was checked for errors. It is possible that there are still dictated errors within this office note. If so, please bring any errors to my attention for an addendum. All efforts were made to ensure that this office note is accurate.  ________________  I, Dr. Brigido Angel, personally performed the services described in this documentation as described by Jennifer Mercer PA-C in my presence, and it is both accurate and complete. Adrián Huang MD, PhD  12/16/2020

## 2022-03-02 ENCOUNTER — OFFICE VISIT (OUTPATIENT)
Dept: ORTHOPEDIC SURGERY | Age: 57
End: 2022-03-02
Payer: COMMERCIAL

## 2022-03-02 VITALS — WEIGHT: 205 LBS | HEIGHT: 73 IN | BODY MASS INDEX: 27.17 KG/M2

## 2022-03-02 DIAGNOSIS — M25.312 ROTATOR CUFF INSUFFICIENCY OF LEFT SHOULDER: ICD-10-CM

## 2022-03-02 DIAGNOSIS — Z96.612 STATUS POST LEFT SHOULDER HEMIARTHROPLASTY: ICD-10-CM

## 2022-03-02 DIAGNOSIS — M19.012 PRIMARY OSTEOARTHRITIS OF LEFT SHOULDER: Primary | ICD-10-CM

## 2022-03-02 DIAGNOSIS — M79.642 PAIN OF LEFT HAND: ICD-10-CM

## 2022-03-02 PROCEDURE — 99213 OFFICE O/P EST LOW 20 MIN: CPT | Performed by: ORTHOPAEDIC SURGERY

## 2022-03-02 ASSESSMENT — ENCOUNTER SYMPTOMS
RESPIRATORY NEGATIVE: 1
EYES NEGATIVE: 1
GASTROINTESTINAL NEGATIVE: 1
ALLERGIC/IMMUNOLOGIC NEGATIVE: 1

## 2022-03-02 NOTE — PROGRESS NOTES
12 [facility]  History and Physical  Shoulder Pain    Date:  3/2/2022    Name:  Arline Mijares. Address:  [facility] [address] [address]    :  1965      Age:   64 y.o.    SSN:  [id number]      Medical Record Number:  [id number]    Reason for Visit:    Follow-up (L shoulder)      HPI:   Arline Mijares. is a 64 y.o. male who presents to our office today for follow up of the left shoulder pain. This patient underwent a left shoulder resurfacing hemiarthroplasty by Dr. Zhanna hernandez on 2017. He is currently little over 4 years postop. The patient reports that he has some mild achiness but is nothing that he can work around. He reports that he continues to maintain a very active life and exercises regularly. He tends to keep his weight is low when he is exercising. He reports that he tries avoid overhead type movements. He denies any new injuries or setbacks since his last visit with us. Pain Assessment  Location of Pain: Shoulder  Location Modifiers: Left  Severity of Pain: 2  Quality of Pain: Dull,Aching  Duration of Pain: Persistent  Frequency of Pain: Constant  Aggravating Factors: Straightening  Limiting Behavior: Some  Relieving Factors: Nsaids  Result of Injury: No  Work-Related Injury: No  Are there other pain locations you wish to document?: No    Review of Systems   Constitutional: Negative. HENT: Negative. Eyes: Negative. Respiratory: Negative. Cardiovascular: Negative. Gastrointestinal: Negative. Endocrine: Negative. Genitourinary: Negative. Musculoskeletal: Negative. Skin: Negative. Allergic/Immunologic: Negative. Neurological: Negative. Hematological: Negative. Psychiatric/Behavioral: Negative. Review of Systems:  A 14 point review of systems available in the scanned medical record as documented by the patient on 3/2/2022.   The review is negative with the exception of those things mentioned in the History of Present Illness and Past Medical History. Past Medical History:  Patient's medications, allergies, past medical, surgical, social and family histories were reviewed and updated as appropriate. Allergies: Allergies   Allergen Reactions    Penicillins Hives       Physical Exam:  There were no vitals filed for this visit. General: Nahid Guadalupe is a healthy and well appearing 64 y.o. male who is sitting comfortably in our office in acute distress. Skin:  There are no skin lesions, cellulitis, or extreme edema. The patient has warm and well-perfused Bilateral upper extremities with brisk capillary refill. Eyes: Extra-ocular muscles intact  Mouth: Oral mucosa moist. No perioral lesions  Pulm: Respirations unlabored and regular. Neuro: Alert and oriented x3    left Shoulder Exam:  Inspection:  No gross deformities, no signs of infection. Palpation: He has no crepitus with passive movement. Active Range of Motion: Forward elevation of 90, abduction of 90, external rotation with elbow at the side 20, internal rotation to the back is L1 versus T12 on the right    Passive Range of Motion: Passively forward elevation can be further increased to 110. Strength: External rotation with resistance with elbow at the side -5/5, internal rotation with resistance with elbow at the side 5/5,  +4/5 champagne toast test    Special Tests: Negative belly press, no Franklyn muscle deformity. Neurovascular: Sensation to light touch is intact, no motor deficits, palpable radial pulses 2+    Additional Examinations:    Examination of the contralateral extremity does not show any tenderness, deformity or injury. Range of motion is unremarkable. There is no gross instability. There are no rashes, ulcerations or lesions.  Strength and tone are normal.      Radiographic:  3 xray views of the left  shoulder including True AP in internal and external and axillary lateral were taken in our office today reveals a stemless hemiarthroplasty. The humerus is slightly high riding with some narrowing of the acromium to tuberosity height. No signs of loosening or fractures noted   Self assessment questionnaires including ASES and Simple Shoulder Test were completed today. Assessment:  Michelle Quintanilla is a 64 y.o. male who underwent a left shoulder resurfacing hemiarthroplasty on December 1, 2017. He is showing signs of rotator cuff insufficiency and wearing of the glenoid. However, clinically he is doing quite well. Specifically, his pain levels are quite modest.    Impression:  Encounter Diagnoses   Name Primary?  Primary osteoarthritis of left shoulder Yes    Status post left shoulder hemiarthroplasty     Pain of left hand     Rotator cuff insufficiency of left shoulder        Office Procedures:  Orders Placed This Encounter   Procedures    XR SHOULDER LEFT (MIN 2 VIEWS)     Standing Status:   Future     Number of Occurrences:   1     Standing Expiration Date:   3/2/2023     Order Specific Question:   Reason for exam:     Answer:   yearly check   Tita Taylor MD, Hand Surgery (Hand, Wrist, Upper Extremity), [facility]     Referral Priority:   Routine     Referral Type:   Eval and Treat     Referral Reason:   Specialty Services Required     Referred to Provider:   Prakash Jones MD     Requested Specialty:   Orthopedic Surgery     Number of Visits Requested:   1       Plan:   Overall, Michelle Quintanilla is doing reasonably well. He should be able to continue to maintain an active lifestyle. We recommend that he focus on doing his rotator cuff strengthening exercises several times a week. He may continue to activities as tolerated. We recommend that he follow-up at least yearly or every 2 years for repeat radiographs so that we can keep a close monitor on his joint. He was asked to fill out a self-assessment questionnaire today.   He may take oral NSAIDs as needed. He was agreeable to that plan. All his questions were fully answered today. Of note he was referred to Dr. Lorenzo Cerda for further evaluation and treatment of his left hand pain. 3/2/2022  10:29 AM      Jesica Matos PA-C  Orthopaedic Sports Medicine Physician Assistant    During this examination, I, Jesica Matos PA-C, functioned as a scribe for Dr. Dae Rivers. This dictation was performed with a verbal recognition program (DRAGON) and it was checked for errors. It is possible that there are still dictated errors within this office note. If so, please bring any errors to my attention for an addendum. All efforts were made to ensure that this office note is accurate.  _________________  I, Dr. Dae Rivers, personally performed the services described in this documentation as described by Jesica Matos PA-C in my presence, and it is both accurate and complete. Adrián Palomino MD, PhD  3/2/2022

## 2022-06-08 ENCOUNTER — OFFICE VISIT (OUTPATIENT)
Dept: ORTHOPEDIC SURGERY | Age: 57
End: 2022-06-08
Payer: COMMERCIAL

## 2022-06-08 VITALS — WEIGHT: 205 LBS | RESPIRATION RATE: 16 BRPM | BODY MASS INDEX: 27.17 KG/M2 | HEIGHT: 73 IN

## 2022-06-08 DIAGNOSIS — M65.312 TRIGGER THUMB OF LEFT HAND: Primary | ICD-10-CM

## 2022-06-08 PROCEDURE — 99204 OFFICE O/P NEW MOD 45 MIN: CPT | Performed by: PHYSICIAN ASSISTANT

## 2022-06-08 PROCEDURE — 20551 NJX 1 TENDON ORIGIN/INSJ: CPT | Performed by: PHYSICIAN ASSISTANT

## 2022-06-08 RX ORDER — TRIAMCINOLONE ACETONIDE 40 MG/ML
20 INJECTION, SUSPENSION INTRA-ARTICULAR; INTRAMUSCULAR ONCE
Status: COMPLETED | OUTPATIENT
Start: 2022-06-08 | End: 2022-06-08

## 2022-06-08 RX ORDER — ROSUVASTATIN CALCIUM 5 MG/1
TABLET, COATED ORAL
COMMUNITY
Start: 2022-04-11

## 2022-06-08 RX ADMIN — TRIAMCINOLONE ACETONIDE 20 MG: 40 INJECTION, SUSPENSION INTRA-ARTICULAR; INTRAMUSCULAR at 14:48

## 2022-06-08 SDOH — HEALTH STABILITY: PHYSICAL HEALTH: ON AVERAGE, HOW MANY DAYS PER WEEK DO YOU ENGAGE IN MODERATE TO STRENUOUS EXERCISE (LIKE A BRISK WALK)?: 5 DAYS

## 2022-06-08 SDOH — HEALTH STABILITY: PHYSICAL HEALTH: ON AVERAGE, HOW MANY MINUTES DO YOU ENGAGE IN EXERCISE AT THIS LEVEL?: 60 MIN

## 2022-06-08 NOTE — PROGRESS NOTES
Mr. Duran Choudhury is a 64 y.o. right handed man  who is seen today in Hand Surgical Consultation at the request of Rahat Lyn MD.    He presents today regarding left symptoms which have been present for approximately 7 months. A history of antecedent trauma or injury is Absent. He reports symptoms to include mild pain and stiffness with frequent popping, catching or locking of the left Thumb. Finger symptoms are Daily worse in the morning or overnight. He reports mild pain located at the base of the symptomatic finger(s). Symptoms show no change over time. Previous treatment has included conservative measures. He does not claim relation of his symptoms to his required work activities. He has not undergone any form of testing. I have today reviewed with Duran Reis. the clinically relevant, past medical history, medications, allergies,  family history, social history, and Review Of Systems & I have documented any details relevant to today's presenting complaints in my history above. Mr. Barbara Davis Jr.'s self-reported past medical history, medications, allergies,  family history, social history, and Review Of Systems have been scanned into the chart under the \"Media\" tab. Physical Exam:  Mr. Barbara Davis Jr.'s most recent vitals:  Vitals  Resp: 16  Height: 6' 1\" (185.4 cm)  Weight: 205 lb (93 kg)    He is well nourished, oriented to person, place & time. He demonstrates appropriate mood and affect as well as normal gait and station. Skin: Normal in appearance, Normal Color and Free of Lesions Bilaterally   Digital range of motion is without significant limitation on the Left, normal on the Right. Inducible triggering is noted upon flexion in the left Thumb. There is not an associated flexion contracture of the PIP joint. No other digit demonstrates evidence for stenosing tenosynovitis bilaterally.   Wrist range of motion is without significant limitation bilaterally. Sensation is normal in the Whole Hand bilaterally  Vascular examination reveals normal, good capillary refill and good color bilaterally  Swelling is mild in the symptomatic digit, absent elsewhere bilaterally  There is no evidence of gross joint instability bilaterally. Muscular strength is clinically appropriate bilaterally. Examination for Stenosing Tenosynovitis demonstrates mild tenderness, thickening & nodularity at the A-1 pulley(s) of the Left Thumb. There is a palpable Nota's Node. There is Inducible triggering on active flexion with pain. No other digits demonstrate evidence of Stenosing Tenosynovitis. Examination of the first Carpo-Metacarpal Joints of the wrist demonstrates no radial subluxation of the Thumb Metacarpal base upon the Trapezium. There is no significant pain with palpation or crepitance at the [facility] joint line. Impression:  Mr. Mazin Victor has developed Stenosing Tenosynovitis (Trigger Finger), which is currently exacerbated, and presents requesting further treatment. Plan:    I have had a thorough discussion with Mr. Mazin Victor regarding the treatment options available for his initially presenting Left Thumb stenosing tenosynovitis, which is causing him functional limitations. I have outlined for Mr. Mazin Victor the the various treatment modalities available to him, including the options and utility of the  judicious use of over-the-counter anti-inflammatory medications (if allowed by his primary care physician), the temporary relief afforded by injection of corticosteroids, and the more definitive option of surgical treatment for this problem if he feels that the duration or severity of his symptoms merits surgical intervention. I have explained  the reasonable expectations for the long term success of each option and the likelihood that further more aggressive treatment could be required for his current presenting condition. Based upon our current discussion and a reasonable understating of the options available to him, Mr. Vincent Robles has selected to proceed with  an injection to the left Thumb Flexor Tendon Sheath. I have outlined for him the nature of the injection, and the pre, vin and post injection considerations and the appropriate expectations for this injection. I have clearly explained to him that the above outlined treatment plan should not be expected to 'cure' his stenosing tenosynovitis, but we are rather treating the symptoms with which he presents. He has understood that in order to achieve long lasting relief of his symptoms and to prevent future worsening or further damage, that definitive surgical treatment would be required. Mr. Vincent Robles  voiced an appropriate understanding of our discussion, the options available to him, and of the expectations of his selected  treatment. He did wish to proceed with Left Thumb Flexor Tendon Sheath injection. Procedure:  left Thumb Trigger Finger Injection  [first Injection]: After full discussion of the nature of this process and outlining a treatment plan with Mr. Vincent Robles, we discussed the complications, limitations, expectations, alternatives, and risks of injection of the flexor tendon sheath. I have explained the potential for bleeding, infection, potential side effects of the medication, and the remote possibility of damage to surrounding structures as result of the injection. He understood this information well and verbally consented to this treatment. The skin of the symptomatic digit was prepped with Isopropyl Alcohol and under aseptic conditions the flexor tendon sheath was injected with a combination of 1/2 ml of 0.25% Bupivacaine without Epinephrine and 20 mg of Triamcinolone (40 mg/ml). Good filling of the flexor sheath was noted.    A dry sterile bandage was applied and the patient tolerated the injection without difficulty. I advised the patient of the expected response, possible reactions and the instructions for care of the hand. He is instructed in the judicious use of over-the-counter anti-inflammatory medications or pain relievers for his symptoms if allowed by his primary care physician. I have also discussed with Mr. Ramesh Maravilla. the other treatment options available to him for this condition. We have today selected to proceed with treatment by injection with steroid medication. He and I have agreed that if our current course of Injection treatment does not prove to be effective over the short term future, that he will schedule a follow-up appointment to discuss and select an alternate course of therapy including possibly further conservative treatment or surgical treatment. Mr. Ramesh Maravilla. has been given a full verbal list of instructions and precautions related to his present condition. I have asked him to followup with me in the office at the prescribed time. He is also specifically requested to call or return to the office sooner if his symptoms change or worsen prior to the next scheduled appointment.

## 2022-06-08 NOTE — PROGRESS NOTES
Administrations This Visit     triamcinolone acetonide (KENALOG-40) injection 20 mg     Admin Date  06/08/2022  14:48 Action  Given Dose  20 mg Route  Other Site  Finger (See Comments) Administered By  Tee York MA    Ordering Provider: Monse Toussaint PA-C    NDC: 8230-1146-55    Lot#: [id number]    : [employer] (PRIMARY CARE)    Patient Supplied?: No    Comments: Left Thumb

## 2022-09-26 ENCOUNTER — TELEPHONE (OUTPATIENT)
Dept: ORTHOPEDIC SURGERY | Age: 57
End: 2022-09-26

## 2022-09-26 NOTE — TELEPHONE ENCOUNTER
Lvm for pt to call back. If pt has any questions or concerns regarding sx, he will have to schedule an appt to discuss. If not, ok to proceed and schedule sx.

## 2022-09-26 NOTE — LETTER
CONSENT TO OPERATION  AND/OR OTHER PROCEDURE(S)          PATIENT : Elidia Julien. YOB: 1965      DATE : 9/28/22          1. I request and consent that Dr. Pacheco Walsh,  and/or his associates or assistants perform an operation and/or procedures on the above patient at  [place] [address], to treat the condition(s) which appear indicated by the diagnostic studies already performed. I have been told that in general terms the nature, purpose and reasonable expectations of the operation and/or procedure(s) are:      Left Thumb Trigger Finger Release      2. It has been explained to me by the informing physician that during the course of the operation and/or procedure(s) unforeseen conditions may be revealed that necessitate an extension of the original operation and/or procedure(s) or different operation and/or procedures than those set forth in Paragraph 1. I therefore authorize and request that my physician and/or his associates or assistants perform such operations and/or procedures as are necessary and desirable in the exercise of professional judgment. The authority granted under this Paragraph 2 shall extend to all conditions that require treatment and are known to my physician at the time the operation is commenced. 3. I have been made aware by the informing physician of certain risks and consequences that are associated with the operation and/or procedure(s) described in Paragraph 1, the reasonable alternative methods or treatment, the possible consequences, the possibility that the operation and/or procedure(s) may be unsuccessful and the possibility of complications.   I understand the reasonably known risks to be:      - Bleeding  - Infection  - Poor Healing  - Possible Damage to Nerve, Vessel, Tendon/Muscle or Bone  - Need for further Treatment/Surgery  - Stiffness  - Pain  - Residual or Recurrent Symptoms  - Anesthetic and/or Medical Risks  - We have discussed the specific limitations and risks of hospital and/or office based treatment at this time due to the COVID-19 pandemic                I have been counseled about the risks of tanvi Covid-19 in the vin-operative and post-operative periods related to this procedure. I have been made aware that tanvi Covid-19 around the time of a surgical procedure may worsen my prognosis for recovering from the virus and lend to a higher morbidity and or mortality risk. With this knowledge, I have requested to proceed with the procedure as scheduled. 4. I have also been informed by the informing physician that there are other risks from both known and unknown causes that are attendant to the performance of any surgical procedure. I am aware that the practice of medicine and surgery is not an exact science, and that no guarantees have been made to me concerning the results of the operation and/or procedure(s). 5. I   CONSENT / REFUSE CONSENT  (strike the phrase that does not apply) to the taking of photographs before, during and/or after the operation or procedure for scientific/educational purposes. 6. I consent to the administration of anesthesia and to the use of such anesthetics as may be deemed advisable by the anesthesiologist who has been engaged by me or my physician.     7. I certify that I have read and understand the above consent to operation and/or other procedure(s); that the explanations therein referred to were made to me by the informing physician in advance of my signing this consent; that all blanks or statements requiring insertion or completion were filled in and inapplicable paragraphs, if any, were stricken before I signed; and that all questions asked by me about the operation and/or procedure(s) which I have consented to have been fully answered in a satisfactory manner.                                 _______________________           9/28/22 Witness     Signature Of Patient         Date        Rashi Carney                                                 Informing Physician                                           Signature of Informing Physician                              If patient is unable to sign or is a minor, complete one of the following:    (A)  Patient is a minor   years of age. (B)  Patient is unable to sign because: The undersigned represents that he or she is duly authorized to execute this consent for and on behalf of the above named patient. Witness               o  Parent  o  Guardian   o  Spouse       o  Other (specify)                                           Patient Name: Reza Santiago. Patient YOB: 1965  Dr. Montana Hughes' Return To Work Policy  Regarding your ability to return to work after surgery or injury, Dr. Montana Hughes will not state that any patient is off of work or cannot work at all. He will place you on restrictions after your surgical procedure or injury. Depending on the details of your particular situation, Dr. Montana Hughes may state that you will have either light use or no use of your hand for a specific number of weeks. It is your obligation to communicate with your employer regarding your restrictions. It is your employer's decision as to whether they will accommodate your restrictions (i.e. allow you to come to work in your restricted capacity) or to not allow you to return to work under your restrictions. Dr. Montana Hughes does not participate in making this decision and cannot influence your employer regarding their decision. If you do not communicate your restrictions to your employer, or if you do not present to work as you are scheduled to, Dr. Montana Hughes will not provide an 'excuse' to explain your absence. A doctors note, or official forms (BWC, FMLA, etc.) will be filled out, upon request, to indicate your date of surgery and your restrictions as stated above.    [facility] Narcotic Policy  Patients will only be prescribed narcotics after surgical procedures or significant injury. Not all procedures cause pain great enough to require Narcotics and thus, not all patients will receive prescriptions after surgical procedures or injuries. Narcotics are never prescribed for chronic conditions. Narcotics are never prescribed for use longer than one week at a time. Refills are only granted in unusual circumstances and only at Dr. Gilmer Rivera discretion. Patients who are receiving narcotic medication from another physician or who are under pain management contracts will not be given a prescription for narcotics for any reason. Surgery Arrival Time:  You have been advised of the START TIME for your surgery as well as the ARRIVAL TIME at which you need to arrive at the surgery facility. Please understand that there is a certain amount of preparation which takes place at the surgery facility prior to the start of your surgery. If you arrive later than your scheduled ARRIVAL TIME, it may be necessary to cancel your surgery for that day and reschedule your procedure due to lack of adequate time for pre-surgery preparations. Thank you for being on time for your arrival.    I have read the above policies and understand that by agreeing to proceed with treatment by Dr. Bere Berg and his team, that I am agreeing to abide by these policies. Patient Name:  Nubia Olivera.     Patient Signature:  _____________________________    FAZAL Date:   9/28/22

## 2022-09-26 NOTE — TELEPHONE ENCOUNTER
Surgery and/or Procedure Scheduling     Contact Name: Laure Burrell. Surgical/Procedure Request: [facility] [address] [facility].  [address] [facility]   Patient Contact Number: [phone]

## 2022-09-26 NOTE — TELEPHONE ENCOUNTER
Patient calling back apologizing he is in an out of appointments, states if he could get a call back before 2 to schedule SX he'd appreciate it.  Please advise

## 2022-09-26 NOTE — LETTER
[address] [facility] - [facility]  Surgery Scheduling Form:      DEMOGRAPHICS:                                                                                                              .    Patient Name:  Shaylee Segal. Patient :  1965   Patient SS#:      Patient Phone:  [phone] (home) [phone] (work) Alt. Patient Phone:    Patient Address:  [address]  [place] [id number]    PCP:  Yoli Olson MD  Insurance:    Payer/Plan Subscr  Sex Relation Sub. Ins. ID Effective Group Num   1. [facility] 10/25/1968 Female Spouse [id number]                                   [address] [address]     Insurance ID Number:    DIAGNOSIS & PROCEDURE:                                                                                            .    Diagnosis:   left Thumb Trigger Finger (727.03/M65.30)  Operation:  left Thumb Trigger Finger Release  [CPT: 69531]  Location:  [facility]  Surgeon:  Mustapha Etienne    SCHEDULING INFORMATION:                                                                                         .    Surgeon's Scheduling Instruction:  elective    RN Post-op Appt:  [x] Yes   [] No  Preferred Thursday:   [] Yes   [] No    Requested Date:  12/15/22 OR Time:  7:50am Patient Arrival Time:    OR Time Required:  10  Minutes  Anesthesia:  MAC/TIVA  Equipment:  None  Mini C-Arm:  No   Standard C-Arm:  No  Status:  outpatient  PAT Required:  Yes  Comments:                      Piedad Burkitt B. Dewane Cart, MD  22 8:59 AM    BILLING INFORMATION:                                                                                                    .    Procedure:       CPT Code Modifier  left Thumb Trigger Finger Release      .                                        Pre Operative Physician Prophylaxis Orders - SCIP Protocols      Pre-Operative Antibiotic Order:    Allergies   Allergen Reactions    Penicillins Hives          [x]  ----  No Antibiotic Ordered       [] ----  Give the following Antibiotic within 1 hour prior to start time:         Ancef 1 gram IV if patient is less than 200 pounds    or       Ancef 2 grams IV if patient is greater than 200 pounds    or      Vancomycin 1 gram IV (over 1 hour) if patient is allergic to           PENICILLINS or CEFALOSPORINS            Procedure: left Thumb Trigger Finger Release     Patient: Grey Najera.   :    1965    Physician Signature:     Date: 22  Time: 8:59 AM

## 2022-09-28 ENCOUNTER — TELEPHONE (OUTPATIENT)
Dept: ORTHOPEDIC SURGERY | Age: 57
End: 2022-09-28

## 2022-11-25 ENCOUNTER — TELEPHONE (OUTPATIENT)
Dept: ORTHOPEDIC SURGERY | Age: 57
End: 2022-11-25

## 2022-11-25 NOTE — TELEPHONE ENCOUNTER
CPT: 17735  BODY PART: left thumb  STATUS: outpatient  LOCATION: [place]  AUTHORIZATION: NPR    Per [employer], [facility]

## 2022-12-07 NOTE — PROGRESS NOTES
Sravanine Abran :  1965    DOS:  12/15/22    call placed to Dr. Katerin Sanches #[phone] for medical clearance, labs and EKG    UPDATE: Called Dr Bird Mcintosh office spoke with  Bonifacio Khalil , she will have lab results and EKG tracing faxed over. Await fax.     UPDATE ON 22:  H&P/lab results noted in epic--call placed to PCP for copy of EKG tracing

## 2022-12-07 NOTE — PROGRESS NOTES
[address] [facility] time ___0620_________        Surgery time ___0750_________    Take the following medications with a sip of water: Follow your MD/Surgeons pre-procedure instructions regarding your medications     Do not eat or drink anything after 12:00 midnight prior to your surgery. This includes water chewing gum, mints and ice chips. You may brush your teeth and gargle the morning of your surgery, but do not swallow the water     Please see your family doctor/pediatrician for a history and physical and/or concerning medications. Bring any test results/reports from your physicians office. If you are under the care of a heart doctor or specialist doctor, please be aware that you may be asked to them for clearance    You may be asked to stop blood thinners such as Coumadin, Plavix, Fragmin, Lovenox, etc., or any anti-inflammatories such as:  Aspirin, Ibuprofen, Advil, Naproxen prior to your surgery. We also ask that you stop any OTC medications such as fish oil, vitamin E, glucosamine, garlic, Multivitamins, COQ 10, etc.    We ask that you do not smoke 24 hours prior to surgery  We ask that you do not  drink any alcoholic beverages 24 hours prior to surgery     You must make arrangements for a responsible adult to take you home after your surgery. For your safety you will not be allowed to leave alone or drive yourself home. Your surgery will be cancelled if you do not have a ride home. Also for your safety, it is strongly suggested that someone stay with you the first 24 hours after your surgery. A parent or legal guardian must accompany a child scheduled for surgery and plan to stay at the hospital until the child is discharged. Please do not bring other children with you. For your comfort, please wear simple loose fitting clothing to the hospital.  Please do not bring valuables.     Do not wear any make-up or nail polish on your fingers or toes      For your safety, please do not wear any jewelry or body piercing's on the day of surgery. All jewelry must be removed. If you have dentures, they will be removed before going to operating room. For your convenience, we will provide you with a container. If you wear contact lenses or glasses, they will be removed, please bring a case for them. If you have a living will and a durable power of  for healthcare, please bring in a copy. As part of our patient safety program to minimize surgical site infections, we ask you to do the following:    Please notify your surgeon if you develop any illness between         now and the  day of your surgery. This includes a cough, cold, fever, sore throat, nausea,         or vomiting, and diarrhea, etc.   Please notify your surgeon if you experience dizziness, shortness         of breath or blurred vision between now and the time of your surgery. Do not shave your operative site 96 hours prior to surgery. For face and neck surgery, men may use an electric razor 48 hours   prior to surgery. You may shower the night before surgery or the morning of   your surgery with an antibacterial soap. You will need to bring a photo ID and insurance card    [facility] has an onsite pharmacy, would you like to utilize our pharmacy     If you will be staying overnight and use a C-pap machine, please bring   your C-pap to hospital     Our goal is to provide you with excellent care, therefore, visitors will be limited to two(2) in the room at a time so that we may focus on providing this care for you. Please contact pre-admission testing if you have any further questions. [facility] phone number:  [address] PAT fax number:  [phone]  Please note these are generalized instructions for all surgical cases, you may be provided with more specific instructions according to your surgery.     C-Difficile admission screening and protocol:       * Admitted with diarrhea? [] YES    [x]  NO     *Prior history of C-Diff. In last 3 months? [] YES    [x]  NO     *Antibiotic use in the past 6-8 weeks? []  NO    [x]  YES                 If yes, which ANTIBIOTIC AND REASON__dental work____     *Prior hospitalization or nursing home in the last month? []  YES    []  NO        SAFETY FIRST. .call before you fall

## 2022-12-14 ENCOUNTER — ANESTHESIA EVENT (OUTPATIENT)
Dept: OPERATING ROOM | Age: 57
End: 2022-12-14
Payer: COMMERCIAL

## 2022-12-15 ENCOUNTER — ANESTHESIA (OUTPATIENT)
Dept: OPERATING ROOM | Age: 57
End: 2022-12-15
Payer: COMMERCIAL

## 2022-12-15 ENCOUNTER — HOSPITAL ENCOUNTER (OUTPATIENT)
Age: 57
Setting detail: OUTPATIENT SURGERY
Discharge: HOME | End: 2022-12-15
Attending: ORTHOPAEDIC SURGERY | Admitting: ORTHOPAEDIC SURGERY
Payer: COMMERCIAL

## 2022-12-15 VITALS
WEIGHT: 205 LBS | SYSTOLIC BLOOD PRESSURE: 102 MMHG | DIASTOLIC BLOOD PRESSURE: 55 MMHG | TEMPERATURE: 97 F | HEART RATE: 51 BPM | OXYGEN SATURATION: 97 % | BODY MASS INDEX: 27.17 KG/M2 | RESPIRATION RATE: 16 BRPM | HEIGHT: 73 IN

## 2022-12-15 PROCEDURE — 2500000003 HC RX 250 WO HCPCS: Performed by: NURSE ANESTHETIST, CERTIFIED REGISTERED

## 2022-12-15 PROCEDURE — 2709999900 HC NON-CHARGEABLE SUPPLY: Performed by: ORTHOPAEDIC SURGERY

## 2022-12-15 PROCEDURE — 2500000003 HC RX 250 WO HCPCS: Performed by: ORTHOPAEDIC SURGERY

## 2022-12-15 PROCEDURE — 2580000003 HC RX 258: Performed by: ANESTHESIOLOGY

## 2022-12-15 PROCEDURE — 7100000000 HC PACU RECOVERY - FIRST 15 MIN: Performed by: ORTHOPAEDIC SURGERY

## 2022-12-15 PROCEDURE — 7100000001 HC PACU RECOVERY - ADDTL 15 MIN: Performed by: ORTHOPAEDIC SURGERY

## 2022-12-15 PROCEDURE — 2580000003 HC RX 258: Performed by: ORTHOPAEDIC SURGERY

## 2022-12-15 PROCEDURE — 3700000000 HC ANESTHESIA ATTENDED CARE: Performed by: ORTHOPAEDIC SURGERY

## 2022-12-15 PROCEDURE — 3600000005 HC SURGERY LEVEL 5 BASE: Performed by: ORTHOPAEDIC SURGERY

## 2022-12-15 PROCEDURE — A4217 STERILE WATER/SALINE, 500 ML: HCPCS | Performed by: ORTHOPAEDIC SURGERY

## 2022-12-15 PROCEDURE — 7100000011 HC PHASE II RECOVERY - ADDTL 15 MIN: Performed by: ORTHOPAEDIC SURGERY

## 2022-12-15 PROCEDURE — 6360000002 HC RX W HCPCS: Performed by: NURSE ANESTHETIST, CERTIFIED REGISTERED

## 2022-12-15 PROCEDURE — 7100000010 HC PHASE II RECOVERY - FIRST 15 MIN: Performed by: ORTHOPAEDIC SURGERY

## 2022-12-15 RX ORDER — SODIUM CHLORIDE 0.9 % (FLUSH) 0.9 %
5-40 SYRINGE (ML) INJECTION EVERY 12 HOURS SCHEDULED
Status: DISCONTINUED | OUTPATIENT
Start: 2022-12-15 | End: 2022-12-15 | Stop reason: HOSPADM

## 2022-12-15 RX ORDER — MAGNESIUM HYDROXIDE 1200 MG/15ML
LIQUID ORAL CONTINUOUS PRN
Status: COMPLETED | OUTPATIENT
Start: 2022-12-15 | End: 2022-12-15

## 2022-12-15 RX ORDER — SODIUM CHLORIDE 0.9 % (FLUSH) 0.9 %
5-40 SYRINGE (ML) INJECTION PRN
Status: DISCONTINUED | OUTPATIENT
Start: 2022-12-15 | End: 2022-12-15 | Stop reason: HOSPADM

## 2022-12-15 RX ORDER — PROPOFOL 10 MG/ML
INJECTION, EMULSION INTRAVENOUS CONTINUOUS PRN
Status: DISCONTINUED | OUTPATIENT
Start: 2022-12-15 | End: 2022-12-15 | Stop reason: SDUPTHER

## 2022-12-15 RX ORDER — ONDANSETRON 2 MG/ML
4 INJECTION INTRAMUSCULAR; INTRAVENOUS
Status: DISCONTINUED | OUTPATIENT
Start: 2022-12-15 | End: 2022-12-15 | Stop reason: HOSPADM

## 2022-12-15 RX ORDER — LIDOCAINE HYDROCHLORIDE 20 MG/ML
INJECTION, SOLUTION EPIDURAL; INFILTRATION; INTRACAUDAL; PERINEURAL PRN
Status: DISCONTINUED | OUTPATIENT
Start: 2022-12-15 | End: 2022-12-15 | Stop reason: SDUPTHER

## 2022-12-15 RX ORDER — SODIUM CHLORIDE 9 MG/ML
INJECTION, SOLUTION INTRAVENOUS PRN
Status: DISCONTINUED | OUTPATIENT
Start: 2022-12-15 | End: 2022-12-15 | Stop reason: HOSPADM

## 2022-12-15 RX ORDER — PROPOFOL 10 MG/ML
INJECTION, EMULSION INTRAVENOUS PRN
Status: DISCONTINUED | OUTPATIENT
Start: 2022-12-15 | End: 2022-12-15 | Stop reason: SDUPTHER

## 2022-12-15 RX ADMIN — PROPOFOL 170 MG: 10 INJECTION, EMULSION INTRAVENOUS at 07:56

## 2022-12-15 RX ADMIN — LIDOCAINE HYDROCHLORIDE 40 MG: 20 INJECTION, SOLUTION EPIDURAL; INFILTRATION; INTRACAUDAL; PERINEURAL at 07:56

## 2022-12-15 RX ADMIN — SODIUM CHLORIDE: 9 INJECTION, SOLUTION INTRAVENOUS at 07:06

## 2022-12-15 RX ADMIN — PROPOFOL 200 MCG/KG/MIN: 10 INJECTION, EMULSION INTRAVENOUS at 07:56

## 2022-12-15 ASSESSMENT — ENCOUNTER SYMPTOMS: SHORTNESS OF BREATH: 0

## 2022-12-15 ASSESSMENT — PAIN - FUNCTIONAL ASSESSMENT: PAIN_FUNCTIONAL_ASSESSMENT: NONE - DENIES PAIN

## 2022-12-15 NOTE — OP NOTE
OPERATIVE REPORT          Patient:  Olivia Davidson. YOB: 1965  Date of Service:  12/15/2022   [facility]  [address] [facility]        Preoperative Diagnosis:  Left Thumb trigger finger. Postoperative Diagnosis: Same. Procedure: Left Thumb trigger finger release (A1 pulley release). Surgeon:    Bolivar Bhat MD    Surgical Assistant:    MIKE Robles Assistant    Anesthesia:  Local with sedation. Blood Loss:  Minimal.     Complications:  None. Tourniquet Time:  3 minutes. Indications:  Mr. Olivia Morris  is a 62y.o.  year old male with a Left Thumb trigger finger. I have discussed preoperatively with him the complications, limitations, expectations, alternatives and risks of the planned surgical care. He has voiced an understanding of our discussion. All of his questions have been fully answered to his satisfaction, and he has provided written informed consent to proceed. Procedure:  After written consent was obtained and the proper operative site was identified and marked, Mr. Olivia Morris  was brought to the operating room, placed in the supine position on the operating room table with the Left arm extended upon a hand table. Under an appropriate level of sedation, local anesthetic (1% Lidocaine and 1/2% Marcaine both without Epinephrine) was instilled in the planned surgical field. His Left upper extremity was prepped and draped in the usual sterile fashion. After Eshmarch exsanguination the pneumo-tourniquet was inflated to 250 milimeters of mercury. A 1 centimeter oblique incision was fashioned over the base of the flexor tendon sheath of the Left Thumb. Dissection was carried carefully through the subcutaneous tissues, taking great care to identify and protect the neurovascular structures. The flexor tendon sheath was carefully identified and cleared of surrounding soft tissue.  The A1 pulley was identified and incised longitudinally along its entire length under direct visualization. The flexor tendons were gently withdrawn from the sheath and inspected. They were found to be in good condition. The tendons were returned to their appropriate location and the finger was placed through a full range of motion. There was no evidence of residual stenosis or triggering. The wound was irrigated copiously with sterile saline for irrigation and the pneumo-tourniquet was deflated after a period of 3 minutes elevation. Hemostasis was easily obtained with direct pressure and electrocautery. The wound was closed with interrupted sutures in the skin. The wound was dressed with adaptic, dry sterile gauze, and a bulky, hand based dressing was applied. Mr. Ta Mcwilliams  was awakened from light sedation, having tolerated the procedure without apparent complication, and was returned to the recovery room in stable condition. At the conclusion of the procedure all needle, instrument, and sponge counts were correct. Mauricio Jordan MD   12/15/2022, 7:54 AM

## 2022-12-15 NOTE — PROGRESS NOTES
[address]. To phase II. A/Ox4. VSS. On room air. IV patent. Denies pain. Dressing clean, dry and intact. [address]zman. Phase II. Tolerating PO. A/Ox4. On room air. IV removed per protocol. Dressing remians clean, dry and intact. Denies pain. Discharge instruction reviewed with wife. All belongings returned.

## 2022-12-15 NOTE — ANESTHESIA PRE PROCEDURE
tablets by mouth daily MASS AMINO ACID      Glutamine POWD Take by mouth daily      NONFORMULARY MUSCLE SYNERGY POWDER I SCOOP PO DAILY      Glucosamine-Chondroit-Vit C-Mn (GLUCOSAMINE CHONDR 500 COMPLEX PO) Take 2 tablets by mouth        Current Facility-Administered Medications   Medication Dose Route Frequency Provider Last Rate Last Admin    sodium chloride flush 0.9 % injection 5-40 mL  5-40 mL IntraVENous 2 times per day Karthik Garcia MD        sodium chloride flush 0.9 % injection 5-40 mL  5-40 mL IntraVENous PRN Karthik Garcia MD        0.9 % sodium chloride infusion   IntraVENous PRN Karthik Garcia MD         Vital Signs (Current)   Vitals:    12/07/22 1622 12/15/22 0633   Weight: 205 lb (93 kg) 205 lb (93 kg)   Height: 6' 0.5\" (1.842 m)                                             Vital Signs Statistics (for past 48 hrs)     No data recorded  BP Readings from Last 3 Encounters:   01/15/20 126/72   12/20/19 107/65   12/18/19 116/63       BMI  Body mass index is 27.42 kg/m². Estimated body mass index is 27.42 kg/m² as calculated from the following:    Height as of this encounter: 6' 0.5\" (1.842 m). Weight as of this encounter: 205 lb (93 kg).     CBC   Lab Results   Component Value Date/Time    WBC 3.8 01/21/2021 08:33 AM    RBC 5.32 01/21/2021 08:33 AM    HGB 11.1 01/21/2021 08:33 AM    HCT 35.9 01/21/2021 08:33 AM    MCV 67.4 01/21/2021 08:33 AM    RDW 16.9 01/21/2021 08:33 AM     01/21/2021 08:33 AM     CMP    Lab Results   Component Value Date/Time     12/12/2017 08:54 AM    K 4.4 12/12/2017 08:54 AM     12/12/2017 08:54 AM    CO2 29 12/12/2017 08:54 AM    BUN 12 12/12/2017 08:54 AM    CREATININE 1.1 12/12/2017 08:54 AM    GFRAA >60 12/12/2017 08:54 AM    LABGLOM >60 12/12/2017 08:54 AM    GLUCOSE 135 12/12/2017 08:54 AM    CALCIUM 8.4 12/12/2017 08:54 AM     BMP    Lab Results   Component Value Date/Time     12/12/2017 08:54 AM    K 4.4 12/12/2017 08:54 AM     12/12/2017 08:54 AM    CO2 29 12/12/2017 08:54 AM    BUN 12 12/12/2017 08:54 AM    CREATININE 1.1 12/12/2017 08:54 AM    CALCIUM 8.4 12/12/2017 08:54 AM    GFRAA >60 12/12/2017 08:54 AM    LABGLOM >60 12/12/2017 08:54 AM    GLUCOSE 135 12/12/2017 08:54 AM     POCGlucose  No results for input(s): GLUCOSE in the last 72 hours. [facility]    Lab Results   Component Value Date/Time    PROTIME 10.9 11/30/2017 12:49 PM    INR 0.96 11/30/2017 12:49 PM    APTT 31.0 11/30/2017 12:49 PM     HCG (If Applicable) No results found for: PREGTESTUR, PREGSERUM, HCG, HCGQUANT   ABGs No results found for: PHART, PO2ART, DSK3LGG, RLH6TWH, BEART, X5DCUEJA   Type & Screen (If Applicable)  No results found for: LABABO, LABRH                         BMI: Wt Readings from Last 3 Encounters:       NPO Status:   Date of last liquid consumption: 12/14/22   Time of last liquid consumption: 2100   Date of last solid food consumption: 12/14/22      Time of last solid consumption: 2100       Anesthesia Evaluation  Patient summary reviewed and Nursing notes reviewed  Airway: Mallampati: II  TM distance: >3 FB   Neck ROM: full  Mouth opening: > = 3 FB   Dental:          Pulmonary:       (-) COPD, asthma and shortness of breath                           Cardiovascular:    (+) hyperlipidemia    (-) hypertension, valvular problems/murmurs, past MI, CAD, CABG/stent, dysrhythmias and  angina                Neuro/Psych:      (-) seizures, TIA and CVA           GI/Hepatic/Renal:        (-) GERD, PUD, liver disease and no renal disease       Endo/Other:    (+) : arthritis:., .    (-) diabetes mellitus               Abdominal:             Vascular: negative vascular ROS. Other Findings:           Anesthesia Plan      MAC     ASA 2     (I discussed intravenous sedation to the patient's satisfaction including risks and alternatives. The patient agreed with the plan and has no further questions.     Cate Rock MD )  Induction: intravenous. Anesthetic plan and risks discussed with patient. Plan discussed with CRNA. This pre-anesthesia assessment may be used as a history and physical.    DOS STAFF ADDENDUM:    Pt seen and examined, chart reviewed (including anesthesia, drug and allergy history). No interval changes to history and physical examination. Anesthetic plan, risks, benefits, alternatives, and personnel involved discussed with patient. Patient verbalized an understanding and agrees to proceed.       Lele Verdugo MD  December 15, 2022  6:55 AM

## 2022-12-15 NOTE — ANESTHESIA POSTPROCEDURE EVALUATION
Department of Anesthesiology  Postprocedure Note    Patient: Karoline Deleon. MRN: [id number]  YOB: 1965  Date of evaluation: 12/15/2022      Procedure Summary     Date: 12/15/22 Room / Location: [address]    Anesthesia Start: 7858 Anesthesia Stop: 8699    Procedure: LEFT THUMB TRIGGER FINGER RELEASE (Left: Fingers) Diagnosis:       Trigger finger of left thumb      (LEFT THUMB TRIGGER FINGER)    Surgeons: Leni Hurd MD Responsible Provider: Catie Sauceda MD    Anesthesia Type: MAC ASA Status: 2          Anesthesia Type: No value filed. Luis Enrique Phase I: Luis Enrique Score: 10    Luis Enrique Phase II: Luis Enrique Score: 10      Anesthesia Post Evaluation    Patient location during evaluation: PACU  Level of consciousness: awake and alert  Airway patency: patent  Nausea & Vomiting: no nausea and no vomiting  Complications: no  Cardiovascular status: blood pressure returned to baseline  Respiratory status: acceptable  Hydration status: euvolemic  Comments: Postoperative Anesthesia Note    Name:    Karoline Deleon.   MRN:      [id number]    Patient Vitals in the past 12 hrs:  12/15/22 0829, BP:(!) 102/55, Temp:97 °F (36.1 °C), Temp src:Temporal, Pulse:51, Resp:16, SpO2:97 %  12/15/22 0822, Temp:98 °F (36.7 °C), Pulse:54, Resp:15, SpO2:97 %  12/15/22 0820, BP:102/63, Pulse:54, Resp:14, SpO2:98 %  12/15/22 0815, BP:104/64, Pulse:57, Resp:17, SpO2:96 %  12/15/22 0808, BP:109/67, Temp:97.7 °F (36.5 °C), Temp src:Temporal, Pulse:61, Resp:17, SpO2:93 %  12/15/22 0702, BP:112/69, Temp:97 °F (36.1 °C), Temp src:Temporal, Pulse:(!) 49, Resp:18, SpO2:97 %  12/15/22 0633, Weight:205 lb (93 kg)     LABS:    CBC  Lab Results       Component                Value               Date/Time                  WBC                      3.8                 01/21/2021 08:33 AM        HGB                      11.1 (L)            01/21/2021 08:33 AM        HCT                      35.9 (L) 01/21/2021 08:33 AM        PLT                      176                 01/21/2021 08:33 AM   RENAL  Lab Results       Component                Value               Date/Time                  NA                       139                 12/12/2017 08:54 AM        K                        4.4                 12/12/2017 08:54 AM        CL                       101                 12/12/2017 08:54 AM        CO2                      29                  12/12/2017 08:54 AM        BUN                      12                  12/12/2017 08:54 AM        CREATININE               1.1                 12/12/2017 08:54 AM        GLUCOSE                  135 (H)             12/12/2017 08:54 AM   COAGS  Lab Results       Component                Value               Date/Time                  PROTIME                  10.9                11/30/2017 12:49 PM        INR                      0.96                11/30/2017 12:49 PM        APTT                     31.0                11/30/2017 12:49 PM     Intake & Output:  @58RFQR@    Nausea & Vomiting:  No    Level of Consciousness:  Awake    Pain Assessment:  Adequate analgesia    Anesthesia Complications:  No apparent anesthetic complications    SUMMARY      Vital signs stable  OK to discharge from Stage I post anesthesia care.   Care transferred from Anesthesiology department on discharge from perioperative area

## 2022-12-15 NOTE — PROGRESS NOTES
To pacu from OR. PT asleep. Dressing to left hand dry and intact. Radial pulses palpable. IV infusing. Monitor in sinus rhythm.

## 2022-12-15 NOTE — H&P
Pre-operative Update of H&P:    I  have seen & examined Mr. Meghna Paige. related solely to his hand and upper extremity conditions, prior to the scheduled procedure on the date of his surgery. The indications for the planned surgical procedure & and his upper-extremity condition are unchanged.

## 2022-12-16 PROBLEM — M65.30 TRIGGER FINGER, ACQUIRED: Status: ACTIVE | Noted: 2022-12-16

## 2022-12-22 ENCOUNTER — OFFICE VISIT (OUTPATIENT)
Dept: ORTHOPEDIC SURGERY | Age: 57
End: 2022-12-22

## 2022-12-22 VITALS — HEIGHT: 72 IN | WEIGHT: 205 LBS | RESPIRATION RATE: 16 BRPM | BODY MASS INDEX: 27.77 KG/M2

## 2022-12-22 DIAGNOSIS — M65.312 TRIGGER THUMB OF LEFT HAND: Primary | ICD-10-CM

## 2022-12-22 PROCEDURE — 99024 POSTOP FOLLOW-UP VISIT: CPT | Performed by: ORTHOPAEDIC SURGERY

## 2022-12-22 NOTE — PATIENT INSTRUCTIONS
Postoperative Instructions After Trigger Finger Release    Dr. Sundar Martínez. Rom[place]          After bandages are removed one week from surgery, you may chose to wear a small bandage over the incision if you wish, though you do not need to. Keep incision dry until sutures have fully dissolved  or it has been 14 days since your surgery. Thereafter, you may wash with mild soap and water and shower normally. Once your stiches have fully disappeared & skin appears normal, you should begin gently massaging the incision with Vitamin E (may use Vitamin E lotion or contents of Vitamin E capsule). Work hard on motion of the fingers and wrist, straightening each finger fully and bending each finger fully, bending wrist forward and bending wrist backwards. Do not be concerned if you experience discomfort. This will not damage the surgery. You may begin using the hand as it feels comfortable beginning 12-14 days from the day of surgery. You may not feel entirely comfortable gripping or lifting heavy objects for several weeks. You may expect to see some skin peel off around the incision. You may be left with a small area of pink baby skin. This is quite normal.    Thank you for choosing [facility]) Physicians for your Hand and Upper Extremity needs. If we can be of any further assistance to you, please do not hesitate to contact us.     Office Phone Number:  [phone]  or  [phone]

## 2022-12-22 NOTE — PROGRESS NOTES
Mr. Fernando Davis returns today in follow-up of his recent left Thumb A1 Pulley (Trigger Finger) Release done approximately 1 week ago. He has done well noting mild discomfort and no other reported complications. He notes pre-operative symptoms to be completely resolved at this time. Physical Exam:  Bandage intact and well cared for  Skin incision is healing well, no significant drainage, no dehiscence, no significant erythema. Digital range of motion is full in the Whole Hand, normal in all other digits. Wrist range of motion is full. Sensation is normal in the Thumb. Vascular examination reveals normal, good capillary refill, and good color. Swelling is minimal.  His preoperative triggering is completely resolved. Impression:  Mr. Fernando Davis is doing well after recent left Thumb Trigger Finger Release. Plan:  Mr. Fernando Davis is instructed in work on Active & Passive range of motion of the digits, wrist, & elbow. These modalities were specifically demonstrated to him today. We discussed the appropriateness of gradual resumption of use of the operated hand and the return to normal use as comfort allows. He is given instructions regarding management of the fresh surgical incision and progressive use of desensitization and tissue massage techniques. We discussed the appropriate expectations and timeline for symptom improvement. He is provided a written patient instruction sheet titled: Postoperative Instructions After Trigger Finger Release. I have asked Mr. Fernando Davis to follow-up with me or contact me by telephone over the next 2-4 weeks if his symptoms have not fully resolved or if he has not regained full & painless return of function. He is also specifically instructed to return to the office or call for an appointment sooner if his symptoms are changing or worsening prior to that time.

## 2023-08-02 ENCOUNTER — OFFICE VISIT (OUTPATIENT)
Dept: ORTHOPEDIC SURGERY | Age: 58
End: 2023-08-02
Payer: COMMERCIAL

## 2023-08-02 VITALS — BODY MASS INDEX: 27.77 KG/M2 | WEIGHT: 205 LBS | HEIGHT: 72 IN

## 2023-08-02 DIAGNOSIS — M19.012 PRIMARY OSTEOARTHRITIS OF LEFT SHOULDER: Primary | ICD-10-CM

## 2023-08-02 PROCEDURE — 99213 OFFICE O/P EST LOW 20 MIN: CPT | Performed by: ORTHOPAEDIC SURGERY

## 2023-08-02 NOTE — PROGRESS NOTES
[address]  History and Physical  Shoulder Pain    Date:  2023    Name:  Andres Rodriguez. Address:  [facility] [address] [place] [address]    :  1965      Age:   62 y.o.    SSN:  [id number]      Medical Record Number:  [id number]    Reason for Visit:    Shoulder Pain (F/U LEFT SHOULDER)      HPI:   Andres Huertas is a 62 y.o. male who presents to our office today for follow up of his left shoulder. This patient underwent a left shoulder resurfacing hemiarthroplasty by Dr. Young hernandez on 2017. He reports that he has some mild achiness but is nothing that he can work around. He reports that he continues to maintain a very active life and exercises regularly. He tends to keep his weight is low when he is exercising. He reports that he tries avoid overhead type movements and has learned to self-limit his behavior. Pain Assessment  Location of Pain: Shoulder  Location Modifiers: Left  Severity of Pain: 7  Quality of Pain: Aching  Duration of Pain: Persistent  Frequency of Pain: Constant  Aggravating Factors: Other (Comment), Straightening, Stretching, Exercise  Limiting Behavior: Yes  Relieving Factors: Rest, Other (Comment)  Work-Related Injury: No  Are there other pain locations you wish to document?: No    No notes on file    Review of Systems:  A 14 point review of systems available in the scanned medical record as documented by the patient and reviewed on 2023. The review is negative with the exception of those things mentioned in the History of Present Illness and Past Medical History. Past Medical History:  Patient's medications, allergies, past medical, surgical, social and family histories were reviewed and updated as appropriate. Allergies: Allergies   Allergen Reactions    Penicillins Hives       Physical Exam:  There were no vitals filed for this visit.   General: Andres Huertas is a healthy and well

## 2025-03-04 ENCOUNTER — OFFICE VISIT (OUTPATIENT)
Dept: ORTHOPEDIC SURGERY | Age: 60
End: 2025-03-04
Payer: COMMERCIAL

## 2025-03-04 VITALS — WEIGHT: 205 LBS | BODY MASS INDEX: 27.77 KG/M2 | HEIGHT: 72 IN

## 2025-03-04 DIAGNOSIS — M19.012 PRIMARY OSTEOARTHRITIS OF LEFT SHOULDER: Primary | ICD-10-CM

## 2025-03-04 DIAGNOSIS — M75.102 NONTRAUMATIC TEAR OF LEFT ROTATOR CUFF, UNSPECIFIED TEAR EXTENT: ICD-10-CM

## 2025-03-04 PROCEDURE — 99213 OFFICE O/P EST LOW 20 MIN: CPT | Performed by: ORTHOPAEDIC SURGERY

## 2025-03-04 RX ORDER — CELECOXIB 200 MG/1
200 CAPSULE ORAL DAILY
Qty: 60 CAPSULE | Refills: 5 | Status: SHIPPED | OUTPATIENT
Start: 2025-03-04

## 2025-03-04 NOTE — PROGRESS NOTES
[facility]  History and Physical  Shoulder Pain    Date:  3/4/2025    Name:  Ramon Graf Jr.  Address:  [address] [place] [address] [place] [address]    :  1965      Age:   59 y.o.    SSN:  [id number]      Medical Record Number:  [id number]    Reason for Visit:    Shoulder Pain (OPSP Lt shoulder)      HPI:   Ramon Graf Jr. is a 59 y.o. male who presents to our office today for follow up of his left shoulder. This patient underwent a left shoulder resurfacing hemiarthroplasty by Dr. Edmund hernandez on 2017.  Patient states overall he is doing well after surgery, he has been able to maintain a very active lifestyle and still exercises regularly, he is happy that he still able to do 25 polyps the other day.  He also does complain of some occasional achiness that happens about 1-2 times a month which can persist for a few days.  He states he does take Aleve regularly about 5 times a week once a day to help with some of his shoulder soreness.  He still does complain of some left shoulder stiffness especially with any overhead activities but he has been able to modify his activities and is still overall happy he had the surgery.     No notes on file    Review of Systems:  A 14 point review of systems available in the scanned medical record as documented by the patient and reviewed on 3/4/2025.  The review is negative with the exception of those things mentioned in the History of Present Illness and Past Medical History.      Past Medical History:  Patient's medications, allergies, past medical, surgical, social and family histories were reviewed and updated as appropriate.    Allergies:  Allergies   Allergen Reactions    Penicillins Hives       Physical Exam:  There were no vitals filed for this visit.  General: Ramon Graf Jr. is a healthy and well appearing 59 y.o. male who is sitting comfortably in our office.    Skin:  There are no skin lesions,

## (undated) DEVICE — BANDAGE COMPR W2INXL5YD TAN BRTH SELF ADH WRP W/ HND TEAR

## (undated) DEVICE — WILLIS PACK: Brand: MEDLINE INDUSTRIES, INC.

## (undated) DEVICE — WRAP COHESIVE W2INXL5YD TAN SELF ADH BNDG HND NON STERILE TEAR CARING

## (undated) DEVICE — SOLUTION IV IRRIG 250ML ST LF 0.9% SODIUM 2F7122

## (undated) DEVICE — SHEET,DRAPE,53X77,STERILE: Brand: MEDLINE